# Patient Record
Sex: FEMALE | Race: BLACK OR AFRICAN AMERICAN | NOT HISPANIC OR LATINO | URBAN - METROPOLITAN AREA
[De-identification: names, ages, dates, MRNs, and addresses within clinical notes are randomized per-mention and may not be internally consistent; named-entity substitution may affect disease eponyms.]

---

## 2020-03-21 ENCOUNTER — EMERGENCY (EMERGENCY)
Facility: HOSPITAL | Age: 50
LOS: 0 days | Discharge: HOME | End: 2020-03-21
Attending: EMERGENCY MEDICINE
Payer: COMMERCIAL

## 2020-03-21 VITALS
WEIGHT: 160.06 LBS | OXYGEN SATURATION: 98 % | HEART RATE: 72 BPM | DIASTOLIC BLOOD PRESSURE: 68 MMHG | RESPIRATION RATE: 17 BRPM | TEMPERATURE: 98 F | SYSTOLIC BLOOD PRESSURE: 144 MMHG | HEIGHT: 64 IN

## 2020-03-21 VITALS
OXYGEN SATURATION: 100 % | RESPIRATION RATE: 16 BRPM | HEART RATE: 57 BPM | TEMPERATURE: 98 F | DIASTOLIC BLOOD PRESSURE: 83 MMHG | SYSTOLIC BLOOD PRESSURE: 141 MMHG

## 2020-03-21 DIAGNOSIS — Z96.659 PRESENCE OF UNSPECIFIED ARTIFICIAL KNEE JOINT: ICD-10-CM

## 2020-03-21 DIAGNOSIS — R56.9 UNSPECIFIED CONVULSIONS: ICD-10-CM

## 2020-03-21 DIAGNOSIS — R07.9 CHEST PAIN, UNSPECIFIED: ICD-10-CM

## 2020-03-21 LAB
ALBUMIN SERPL ELPH-MCNC: 3.6 G/DL — SIGNIFICANT CHANGE UP (ref 3.3–5)
ALP SERPL-CCNC: 118 U/L — SIGNIFICANT CHANGE UP (ref 40–120)
ALT FLD-CCNC: 36 U/L — SIGNIFICANT CHANGE UP (ref 12–78)
ANION GAP SERPL CALC-SCNC: 4 MMOL/L — LOW (ref 5–17)
AST SERPL-CCNC: 33 U/L — SIGNIFICANT CHANGE UP (ref 15–37)
BASOPHILS # BLD AUTO: 0.01 K/UL — SIGNIFICANT CHANGE UP (ref 0–0.2)
BASOPHILS NFR BLD AUTO: 0.3 % — SIGNIFICANT CHANGE UP (ref 0–2)
BILIRUB SERPL-MCNC: 0.3 MG/DL — SIGNIFICANT CHANGE UP (ref 0.2–1.2)
BUN SERPL-MCNC: 17 MG/DL — SIGNIFICANT CHANGE UP (ref 7–23)
CALCIUM SERPL-MCNC: 9.2 MG/DL — SIGNIFICANT CHANGE UP (ref 8.5–10.1)
CHLORIDE SERPL-SCNC: 109 MMOL/L — HIGH (ref 96–108)
CK MB CFR SERPL CALC: 1.2 NG/ML — SIGNIFICANT CHANGE UP (ref 0.5–3.6)
CO2 SERPL-SCNC: 28 MMOL/L — SIGNIFICANT CHANGE UP (ref 22–31)
CREAT SERPL-MCNC: 0.79 MG/DL — SIGNIFICANT CHANGE UP (ref 0.5–1.3)
D DIMER BLD IA.RAPID-MCNC: 298 NG/ML DDU — HIGH
EOSINOPHIL # BLD AUTO: 0.05 K/UL — SIGNIFICANT CHANGE UP (ref 0–0.5)
EOSINOPHIL NFR BLD AUTO: 1.3 % — SIGNIFICANT CHANGE UP (ref 0–6)
GLUCOSE SERPL-MCNC: 100 MG/DL — HIGH (ref 70–99)
HCG SERPL-ACNC: 2 MIU/ML — SIGNIFICANT CHANGE UP
HCT VFR BLD CALC: 34.2 % — LOW (ref 34.5–45)
HGB BLD-MCNC: 11.2 G/DL — LOW (ref 11.5–15.5)
IMM GRANULOCYTES NFR BLD AUTO: 0.3 % — SIGNIFICANT CHANGE UP (ref 0–1.5)
LIDOCAIN IGE QN: 173 U/L — SIGNIFICANT CHANGE UP (ref 73–393)
LYMPHOCYTES # BLD AUTO: 1.57 K/UL — SIGNIFICANT CHANGE UP (ref 1–3.3)
LYMPHOCYTES # BLD AUTO: 40.4 % — SIGNIFICANT CHANGE UP (ref 13–44)
MAGNESIUM SERPL-MCNC: 2.1 MG/DL — SIGNIFICANT CHANGE UP (ref 1.6–2.6)
MCHC RBC-ENTMCNC: 31.6 PG — SIGNIFICANT CHANGE UP (ref 27–34)
MCHC RBC-ENTMCNC: 32.7 GM/DL — SIGNIFICANT CHANGE UP (ref 32–36)
MCV RBC AUTO: 96.6 FL — SIGNIFICANT CHANGE UP (ref 80–100)
MONOCYTES # BLD AUTO: 0.34 K/UL — SIGNIFICANT CHANGE UP (ref 0–0.9)
MONOCYTES NFR BLD AUTO: 8.7 % — SIGNIFICANT CHANGE UP (ref 2–14)
NEUTROPHILS # BLD AUTO: 1.91 K/UL — SIGNIFICANT CHANGE UP (ref 1.8–7.4)
NEUTROPHILS NFR BLD AUTO: 49 % — SIGNIFICANT CHANGE UP (ref 43–77)
NRBC # BLD: 0 /100 WBCS — SIGNIFICANT CHANGE UP (ref 0–0)
PLATELET # BLD AUTO: 155 K/UL — SIGNIFICANT CHANGE UP (ref 150–400)
POTASSIUM SERPL-MCNC: 4.5 MMOL/L — SIGNIFICANT CHANGE UP (ref 3.5–5.3)
POTASSIUM SERPL-SCNC: 4.5 MMOL/L — SIGNIFICANT CHANGE UP (ref 3.5–5.3)
PROT SERPL-MCNC: 8 GM/DL — SIGNIFICANT CHANGE UP (ref 6–8.3)
RBC # BLD: 3.54 M/UL — LOW (ref 3.8–5.2)
RBC # FLD: 11.7 % — SIGNIFICANT CHANGE UP (ref 10.3–14.5)
SODIUM SERPL-SCNC: 141 MMOL/L — SIGNIFICANT CHANGE UP (ref 135–145)
TROPONIN I SERPL-MCNC: <.015 NG/ML — SIGNIFICANT CHANGE UP (ref 0.01–0.04)
TROPONIN I SERPL-MCNC: <.015 NG/ML — SIGNIFICANT CHANGE UP (ref 0.01–0.04)
WBC # BLD: 3.89 K/UL — SIGNIFICANT CHANGE UP (ref 3.8–10.5)
WBC # FLD AUTO: 3.89 K/UL — SIGNIFICANT CHANGE UP (ref 3.8–10.5)

## 2020-03-21 PROCEDURE — 93010 ELECTROCARDIOGRAM REPORT: CPT

## 2020-03-21 PROCEDURE — 99285 EMERGENCY DEPT VISIT HI MDM: CPT

## 2020-03-21 PROCEDURE — 71275 CT ANGIOGRAPHY CHEST: CPT | Mod: 26

## 2020-03-21 RX ORDER — ACETAMINOPHEN 500 MG
975 TABLET ORAL ONCE
Refills: 0 | Status: COMPLETED | OUTPATIENT
Start: 2020-03-21 | End: 2020-03-21

## 2020-03-21 RX ADMIN — Medication 975 MILLIGRAM(S): at 15:13

## 2020-03-21 NOTE — ED PROVIDER NOTE - CLINICAL SUMMARY MEDICAL DECISION MAKING FREE TEXT BOX
r/o ACS, r/o PE, r/o pneumothorax r/o ACS, r/o PE, r/o pneumothorax  I read ekg as nsr rate 74, no st elevation or depression, normal qtc, narrow qrs, normal axis, left atrial enlargement. r/o ACS, r/o PE, r/o pneumothorax  I read ekg as nsr rate 74, no st elevation or depression, normal qtc, narrow qrs, normal axis, left atrial enlargement.  trop neg x2. ok for dc home

## 2020-03-21 NOTE — ED PROVIDER NOTE - OBJECTIVE STATEMENT
Pt is a 49 year old female w/PMH of seizures who presents to the ED today for CP. Pt is a 49 year old female w/hx of seizures and knee replacement, who presents to the ED today for right-sided CP x 3 days. CP does not radiate. Denies SOB, headache, fever/chills, N/V/D or cough. Takes medications for knee pain.

## 2020-03-21 NOTE — ED PROVIDER NOTE - MUSCULOSKELETAL, MLM
Spine appears normal, range of motion is not limited, no muscle or joint tenderness, mild reproducible midsternal palpation

## 2020-03-21 NOTE — ED ADULT NURSE NOTE - NSIMPLEMENTINTERV_GEN_ALL_ED
Implemented All Universal Safety Interventions:  [place] to call system. Call bell, personal items and telephone within reach. Instruct patient to call for assistance. Room bathroom lighting operational. Non-slip footwear when patient is off stretcher. Physically safe environment: no spills, clutter or unnecessary equipment. Stretcher in lowest position, wheels locked, appropriate side rails in place.

## 2020-03-21 NOTE — ED PROVIDER NOTE - PATIENT PORTAL LINK FT
You can access the FollowMyHealth Patient Portal offered by [facility] by registering at the following website: [email or web address]. By joining [employer]’s FollowMyHealth portal, you will also be able to view your health information using other applications (apps) compatible with our system.

## 2020-03-21 NOTE — ED ADULT NURSE NOTE - OBJECTIVE STATEMENT
Pt is a 49YOF who is here for pain in her chest, pt states that she has no shortness of breath, no radiation of pain, pt states she has a hx of seizures, pt denies any fever, chills, nausea, vomiting, diarrhea.

## 2020-03-21 NOTE — ED ADULT NURSE NOTE - CHPI ED NUR SYMPTOMS NEG
no nausea/no syncope/no back pain/no chills/no shortness of breath/no vomiting/no fever/no congestion

## 2022-05-27 ENCOUNTER — EMERGENCY (EMERGENCY)
Facility: HOSPITAL | Age: 52
LOS: 1 days | Discharge: HOME | End: 2022-05-27
Admitting: EMERGENCY MEDICINE
Payer: OTHER MISCELLANEOUS

## 2022-05-27 VITALS
RESPIRATION RATE: 18 BRPM | TEMPERATURE: 97 F | HEIGHT: 64 IN | DIASTOLIC BLOOD PRESSURE: 88 MMHG | HEART RATE: 100 BPM | WEIGHT: 164.91 LBS | OXYGEN SATURATION: 100 % | SYSTOLIC BLOOD PRESSURE: 155 MMHG

## 2022-05-27 PROCEDURE — 29125 APPL SHORT ARM SPLINT STATIC: CPT

## 2022-05-27 PROCEDURE — 99053 MED SERV 10PM-8AM 24 HR FAC: CPT

## 2022-05-27 PROCEDURE — 73110 X-RAY EXAM OF WRIST: CPT | Mod: 26,LT

## 2022-05-27 PROCEDURE — 73130 X-RAY EXAM OF HAND: CPT | Mod: 26,LT

## 2022-05-27 PROCEDURE — 99283 EMERGENCY DEPT VISIT LOW MDM: CPT | Mod: 25

## 2022-05-27 NOTE — ED ADULT TRIAGE NOTE - CHIEF COMPLAINT QUOTE
walk in pt with complaints of left hand pain after steel beam fell on it at work. Slight swelling noted at triage.

## 2022-05-28 PROCEDURE — 73130 X-RAY EXAM OF HAND: CPT | Mod: 26,LT

## 2022-05-28 PROCEDURE — 73110 X-RAY EXAM OF WRIST: CPT | Mod: 26,LT

## 2022-05-28 RX ORDER — OXYCODONE AND ACETAMINOPHEN 5; 325 MG/1; MG/1
1 TABLET ORAL ONCE
Refills: 0 | Status: DISCONTINUED | OUTPATIENT
Start: 2022-05-28 | End: 2022-05-28

## 2022-05-28 RX ORDER — MORPHINE SULFATE 50 MG/1
6 CAPSULE, EXTENDED RELEASE ORAL ONCE
Refills: 0 | Status: DISCONTINUED | OUTPATIENT
Start: 2022-05-28 | End: 2022-05-28

## 2022-05-28 RX ORDER — IBUPROFEN 200 MG
600 TABLET ORAL ONCE
Refills: 0 | Status: COMPLETED | OUTPATIENT
Start: 2022-05-28 | End: 2022-05-28

## 2022-05-28 RX ADMIN — Medication 600 MILLIGRAM(S): at 00:30

## 2022-05-28 RX ADMIN — OXYCODONE AND ACETAMINOPHEN 1 TABLET(S): 5; 325 TABLET ORAL at 00:29

## 2022-05-28 NOTE — ED PROVIDER NOTE - CARE PROVIDER_API CALL
Emile Perez (MD)  Plastic Surgery  [address] [place] [address], [address]  [place], [place] [id number]  Phone: [phone]  Fax: [phone]  Follow Up Time: 1-3 Days

## 2022-05-28 NOTE — ED PROVIDER NOTE - PHYSICAL EXAMINATION
Physical Exam    Vital Signs: I have reviewed the initial vital signs.  Constitutional: well-nourished, appears stated age, no acute distress  Cardiovascular: regular rate, regular rhythm, well-perfused extremities, radial pulse +2 and equal b/  Musculoskeletal: +L hand contusion, full ROM in all joints of the hand, no snuff box ttp b/l  Neurologic: extremities’ motor and sensory functions grossly intact

## 2022-05-28 NOTE — ED PROVIDER NOTE - PATIENT PORTAL LINK FT
You can access the FollowMyHealth Patient Portal offered by [facility] by registering at the following website: [email or web address]. By joining [employer] [facility]’s FollowMyHealth portal, you will also be able to view your health information using other applications (apps) compatible with our system.
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
## 2022-05-28 NOTE — ED PROVIDER NOTE - NS ED ROS FT
Review of Systems    Constitutional: (-) fever  Musculoskeletal: (-) neck pain, (-) back pain, (-) joint pain  Integumentary: (-) rash, (-) edema  Neurological: (-) headache, (-) altered mental status  Heme/Lymph: (-) easy bruising (-) easy bleeding

## 2022-05-28 NOTE — ED PROVIDER NOTE - NSFOLLOWUPINSTRUCTIONS_ED_ALL_ED_FT
Hand Contusion  A hand contusion is a deep bruise to the hand. Contusions are the result of a blunt injury to tissues and muscle fibers under the skin. The injury causes bleeding under the skin. The skin overlying the contusion may turn blue, purple, or yellow. Minor injuries will give you a painless contusion, but more severe contusions may stay painful and swollen for a few weeks.    What are the causes?  A contusion is usually caused by a hard hit, trauma, or direct force to your hand, such as having a heavy object fall on your hand.    What are the signs or symptoms?  Symptoms of this condition include:  Swelling of the hand.  Pain and tenderness of the hand.  Discoloration of the hand. The area may have redness and then turn blue, purple, or yellow.  How is this diagnosed?  This condition is diagnosed from a physical exam and your medical history. An X-ray may be needed to see if there are any other injuries, such as broken bones (fractures). Sometimes, a CT scan or MRI may be needed if your health care provider is concerned that you may have torn or injured ligaments.    How is this treated?  An elastic wrap may be recommended to support your hand. In general, the best treatment for a hand contusion is rest, ice, pressure (compression), and elevation of the injured area. This is often called RICE therapy. Over-the-counter medicines may also be recommended for pain control.    Follow these instructions at home:  RICE Therapy     Rest the injured area.  If directed, apply ice to the injured area:  Put ice in a plastic bag.  Place a towel between your skin and the bag.  Leave the ice on for 20 minutes, 2–3 times a day.  If directed, apply light compression to the injured area using an elastic wrap. Make sure the wrap is not too tight. Remove and reapply the wrap as told by your health care provider. If your fingers become numb, cold, or blue, take the wrap off and reapply it more loosely.  Raise (elevate) the injured area above the level of your heart while you are sitting or lying down.  General instructions     Take over-the-counter and prescription medicines only as told by your health care provider.  Protect your hand from getting injured further.  Keep all follow-up visits as told by your health care provider. This is important.  Contact a health care provider if:  Your symptoms do not improve after several days of treatment.  You have increased redness, swelling, or pain in your hand or fingers.  You have difficulty moving the injured area.  Your swelling or pain is not relieved with medicines.  Get help right away if:  You have severe pain.  Your hand or fingers become numb.  Your hand or fingers turn pale, blue, or cold.  You cannot move your hand or wrist.  Your hand is warm to the touch.  This information is not intended to replace advice given to you by your health care provider. Make sure you discuss any questions you have with your health care provider.

## 2022-05-31 DIAGNOSIS — S60.222A CONTUSION OF LEFT HAND, INITIAL ENCOUNTER: ICD-10-CM
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
## 2022-08-04 ENCOUNTER — EMERGENCY (EMERGENCY)
Facility: HOSPITAL | Age: 52
LOS: 0 days | Discharge: HOME | End: 2022-08-04

## 2022-08-04 VITALS
SYSTOLIC BLOOD PRESSURE: 128 MMHG | RESPIRATION RATE: 16 BRPM | HEART RATE: 55 BPM | WEIGHT: 205.03 LBS | HEIGHT: 64 IN | OXYGEN SATURATION: 98 % | DIASTOLIC BLOOD PRESSURE: 79 MMHG | TEMPERATURE: 98 F

## 2022-08-04 DIAGNOSIS — M54.50 LOW BACK PAIN, UNSPECIFIED: ICD-10-CM

## 2022-08-04 DIAGNOSIS — Z86.69 PERSONAL HISTORY OF OTHER DISEASES OF THE NERVOUS SYSTEM AND SENSE ORGANS: ICD-10-CM

## 2022-08-04 PROCEDURE — 99284 EMERGENCY DEPT VISIT MOD MDM: CPT

## 2022-08-04 RX ORDER — METHOCARBAMOL 500 MG/1
1500 TABLET, FILM COATED ORAL ONCE
Refills: 0 | Status: COMPLETED | OUTPATIENT
Start: 2022-08-04 | End: 2022-08-04

## 2022-08-04 RX ORDER — METHOCARBAMOL 500 MG/1
2 TABLET, FILM COATED ORAL
Qty: 30 | Refills: 0
Start: 2022-08-04 | End: 2022-08-08

## 2022-08-04 RX ORDER — KETOROLAC TROMETHAMINE 30 MG/ML
15 SYRINGE (ML) INJECTION ONCE
Refills: 0 | Status: DISCONTINUED | OUTPATIENT
Start: 2022-08-04 | End: 2022-08-04

## 2022-08-04 RX ORDER — IBUPROFEN 200 MG
1 TABLET ORAL
Qty: 20 | Refills: 0
Start: 2022-08-04 | End: 2022-08-08

## 2022-08-04 RX ADMIN — Medication 15 MILLIGRAM(S): at 23:20

## 2022-08-04 RX ADMIN — METHOCARBAMOL 1500 MILLIGRAM(S): 500 TABLET, FILM COATED ORAL at 23:20

## 2022-08-04 NOTE — ED ADULT TRIAGE NOTE - CHIEF COMPLAINT QUOTE
Pt present to ED with c/o right hip pain radiating to knee x 4 days, Pt denies any traum to site. Pt denies any other symptoms. Pt denies PMH

## 2022-08-04 NOTE — ED PROVIDER NOTE - PROVIDER TOKENS
PROVIDER:[TOKEN:[[id number]],FOLLOWUP:[1-3 Days]],FREE:[LAST:[your pmd in 1-3 days],PHONE:[(   )    -],FAX:[(   )    -]]

## 2022-08-04 NOTE — ED PROVIDER NOTE - CLINICAL SUMMARY MEDICAL DECISION MAKING FREE TEXT BOX
+back pain radiating down leg. 2+ pulses.  no red flags.   Reviewed necessity for follow up. Counseled on red flags and to return for them.  Patient appears well on discharge.

## 2022-08-04 NOTE — ED PROVIDER NOTE - CARE PROVIDER_API CALL
Blake Teresa (DO)  Orthopaedic Surgery Surgery  [address], [address] [place] [address]  Phone: [phone]  Fax: [phone]  Follow Up Time: 1-3 Days    your pmd in 1-3 days,   Phone: (   )    -  Fax: (   )    -  Follow Up Time:

## 2022-08-04 NOTE — ED PROVIDER NOTE - OBJECTIVE STATEMENT
51 y/o F with PMH seizures, hx R knee replacement presents with moderate constant throbbing R lower back pain radiating into R anterior leg x 4 days. +much worse with movement, much better with rest.   denies saddle anesthesia, bowel/bladder dysfunction, difficulty ambulating, paraesthesias, direct trauma, hx IVDA, recent injections, weakness, hx back surgeries, unexplained wt loss, fever, cp, sob, n/v/d, rash, other sxs.  Denies hemoptysis, recent surgery/immobilization, hx cancers, hx PE/DVT, hormone use, calf pain or swelling.   no knee trauma/redness/warmth/swelling.

## 2022-08-04 NOTE — ED PROVIDER NOTE - NS ED ROS FT
Review of Systems    Constitutional: (-) fever   Eyes/ENT: (-) vision changes  Cardiovascular: (-) chest pain, (-) syncope (-) palpitations  Respiratory: (-) cough, (-) shortness of breath  Gastrointestinal: (-) vomiting, (-) diarrhea (-) abdominal pain  Genitourinary:  (-) dysuria   Musculoskeletal: (-) neck pain, (+) back pain, (-) leg swelling  Integumentary: (-) rash, (-) edema  Neurological: (-) headache  Hematologic: (-) easy bruising

## 2022-08-04 NOTE — ED PROVIDER NOTE - PHYSICAL EXAMINATION
PHYSICAL EXAM:    GENERAL: Alert, appears stated age, well appearing, non-toxic  SKIN: Warm, pink and dry. MMM.   HEAD: NC, AT  EYE: Normal lids/conjunctiva  ENT: Normal hearing, patent oropharynx   NECK: +supple. No meningismus, or JVD  Pulm: Bilateral BS, normal resp effort, no wheezes, stridor, or retractions  CV: RRR, no M/R/G, 2+and = radial pulses  Abd: soft, non-tender, non-distended, no rebound/guarding. no CVA tenderness.   Mskel: no erythema, cyanosis, edema. no calf tenderness. no spinal TTP. +R sciatic notch TTP. +R paralumbar TTP. no spinal ttp. no knee/hip/femur/calf TTP/swelling/erythema.   Neuro: AAOx3, no sensory/motor deficits, 5/5 strength throughout. normal gait.

## 2022-08-04 NOTE — ED PROVIDER NOTE - PATIENT PORTAL LINK FT
You can access the FollowMyHealth Patient Portal offered by [facility] by registering at the following website: [email or web address]. By joining [employer]’s FollowMyHealth portal, you will also be able to view your health information using other applications (apps) compatible with our system.

## 2022-08-31 ENCOUNTER — EMERGENCY (EMERGENCY)
Facility: HOSPITAL | Age: 52
LOS: 1 days | Discharge: HOME | End: 2022-08-31
Admitting: EMERGENCY MEDICINE

## 2022-08-31 VITALS
TEMPERATURE: 97 F | HEART RATE: 60 BPM | DIASTOLIC BLOOD PRESSURE: 77 MMHG | RESPIRATION RATE: 16 BRPM | SYSTOLIC BLOOD PRESSURE: 146 MMHG | OXYGEN SATURATION: 100 %

## 2022-08-31 VITALS
SYSTOLIC BLOOD PRESSURE: 129 MMHG | RESPIRATION RATE: 16 BRPM | DIASTOLIC BLOOD PRESSURE: 66 MMHG | HEART RATE: 59 BPM | TEMPERATURE: 98 F | OXYGEN SATURATION: 97 %

## 2022-08-31 DIAGNOSIS — Z96.651 PRESENCE OF RIGHT ARTIFICIAL KNEE JOINT: Chronic | ICD-10-CM

## 2022-08-31 PROCEDURE — 73564 X-RAY EXAM KNEE 4 OR MORE: CPT | Mod: 26,50

## 2022-08-31 PROCEDURE — 99284 EMERGENCY DEPT VISIT MOD MDM: CPT

## 2022-08-31 RX ORDER — ACETAMINOPHEN 500 MG
650 TABLET ORAL ONCE
Refills: 0 | Status: COMPLETED | OUTPATIENT
Start: 2022-08-31 | End: 2022-08-31

## 2022-08-31 RX ADMIN — Medication 650 MILLIGRAM(S): at 14:40

## 2022-08-31 NOTE — ED PROVIDER NOTE - PATIENT PORTAL LINK FT
You can access the FollowMyHealth Patient Portal offered by [facility] by registering at the following website: [email or web address]. By joining [employer]’s FollowMyHealth portal, you will also be able to view your health information using other applications (apps) compatible with our system. You can access the FollowMyHealth Patient Portal offered by [facility] by registering at the following website: [email or web address]. By joining [employer]’s FollowMyHealth portal, you will also be able to view your health information using other applications (apps) compatible with our system. You can access the FollowMyHealth Patient Portal offered by [facility] by registering at the following website: [email or web address]. By joining [employer]’s FollowMyHealth portal, you will also be able to view your health information using other applications (apps) compatible with our system.

## 2022-08-31 NOTE — ED PROVIDER NOTE - OBJECTIVE STATEMENT
51 y/o female with no significant PMHx presents to the ER c/o b/l knee R>L pain s/p mechanical trip and fall.  Pt states she was at work and tripped over some wires.  Pt states she fell onto her knee.  Pt is s/p right knee replacement.  Pt denies head trauma, loc, weakness, numbness, tingling.

## 2022-08-31 NOTE — ED PROVIDER NOTE - CLINICAL SUMMARY MEDICAL DECISION MAKING FREE TEXT BOX
53 y/o female with no significant PMHx presents to the ER c/o b/l knee R>L pain s/p mechanical trip and fall.  Pt is well appearing, NAD, will obtain xrays, pain control, follow up Orthopedics. 51 y/o female with no significant PMHx presents to the ER c/o b/l knee R>L pain s/p mechanical trip and fall.  Pt is well appearing, NAD, will obtain xrays, pain control, follow up Orthopedics.

## 2022-08-31 NOTE — ED PROVIDER NOTE - PHYSICAL EXAMINATION
Right knee: NV intact, well healed linear scar, pt able to flex and extend, +TTP at anterior aspect of knee.   Left knee: NV intact, pt able to flex and extend, +TTP at anterior aspect of knee.  Pt ambulatory without assistance.

## 2022-08-31 NOTE — ED PROVIDER NOTE - NSFOLLOWUPINSTRUCTIONS_ED_ALL_ED_FT
Follow up with your Doctor in 1-2 days.  Follow up with Orthopedics in 1-2 days.(see attached list)  Rest.  Ice 3-4 x a day.   Use cane to ambulate.  Return to the ER for any persistent/worsening or new symptoms weakness, numbness or any concerning symptoms.

## 2022-08-31 NOTE — ED PROVIDER NOTE - GASTROINTESTINAL, MLM
ANTICOAGULATION MANAGEMENT     Patient Name:  Amira Arreola  Date:  2020    ASSESSMENT /SUBJECTIVE:    Today's INR result of 2.3 is therapeutic. Goal INR of 2.0-3.0      Warfarin dose taken: Warfarin taken as previously instructed    Diet: No new diet changes affecting INR    Medication changes/ interactions: No new medications/supplements affecting INR    Previous INR: Therapeutic     S/S of bleeding or thromboembolism: No    New injury or illness: No    Upcoming surgery, procedure or cardioversion: No    Additional findings: None      PLAN:    Spoke with Jessica regarding INR result and instructed:     Warfarin Dosing Instructions: Continue your current warfarin dose of 2mg Tues/Sat; 4mg all other days    Instructed patient to follow up no later than: 6 weeks  Orders given to  Homecare nurse/facility to recheck    Education provided: Target INR goal and significance of current INR result      Jessica verbalizes understanding and agrees to warfarin dosing plan.    Instructed to call the Anticoagulation Clinic for any changes, questions or concerns. (#[phone])        OBJECTIVE:  INR   Date Value Ref Range Status   2020 2.3 (A) 0.90 - 1.10 Final             Anticoagulation Summary  As of 2020    INR goal:   2.0-3.0   TTR:   86.0 % (11.5 mo)   INR used for dosin.3 (2020)   Warfarin maintenance plan:   2 mg (4 mg x 0.5) every Tue, Sat; 4 mg (4 mg x 1) all other days   Full warfarin instructions:   2 mg every Tue, Sat; 4 mg all other days   Weekly warfarin total:   24 mg   Plan last modified:   Ines Dorman RN (2019)   Next INR check:   2020   Priority:   Maintenance   Target end date:   Indefinite    Indications    Long term current use of anticoagulant therapy [Z79.01]  Atrial fibrillation (H) [I48.91] (Resolved) [I48.91]             Anticoagulation Episode Summary     INR check location:       Preferred lab:   EXTERNAL LAB    Send INR reminders to:   DANTE SUMNER     Comments:    Rajiv RN [facility] [phone] private pay nursing visits to check INR      Anticoagulation Care Providers     Provider Role Specialty Phone number    Addy Frias MD [facility] Internal Medicine [phone]          Abdomen soft, non-tender, no guarding.

## 2022-08-31 NOTE — ED ADULT NURSE NOTE - OBJECTIVE STATEMENT
received pt in intake room 10A, 52 yr/o female A+OX4, ambulatory at baseline. pt presented to ED c/o B/L knee pain s/p trip and fall, denies hitting her head or LOC. pt denies chest pain and SOB. pt is stable at this time. no defomities noted at joints.

## 2022-08-31 NOTE — ED ADULT NURSE NOTE - NSIMPLEMENTINTERV_GEN_ALL_ED
Implemented All Universal Safety Interventions:  [place] to call system. Call bell, personal items and telephone within reach. Instruct patient to call for assistance. Room bathroom lighting operational. Non-slip footwear when patient is off stretcher. Physically safe environment: no spills, clutter or unnecessary equipment. Stretcher in lowest position, wheels locked, appropriate side rails in place. Implemented All Universal Safety Interventions:  [place] to call system. Call bell, personal items and telephone within reach. Instruct patient to call for assistance. Room bathroom lighting operational. Non-slip footwear when patient is off stretcher. Physically safe environment: no spills, clutter or unnecessary equipment. Stretcher in lowest position, wheels locked, appropriate side rails in place.

## 2022-08-31 NOTE — ED PROVIDER NOTE - NSICDXPASTSURGICALHX_GEN_ALL_CORE_FT
Detail Level: Zone Quality 110: Preventive Care And Screening: Influenza Immunization: Influenza Immunization Administered during Influenza season Quality 226: Preventive Care And Screening: Tobacco Use: Screening And Cessation Intervention: Patient screened for tobacco use and is an ex/non-smoker PAST SURGICAL HISTORY:  S/P total knee replacement, right

## 2022-09-20 NOTE — ED PROVIDER NOTE - NS ED MD DISPO DISCHARGE CCDA
When pt is no longer an acute or imminent risk of harm to self or others, and is able to care for self safely, pt may then be discharged.   CGI less than or equal to 3 When pt is no longer an acute or imminent risk of harm to self or others, and is able to care for self safely, pt may then be discharged.  Patient/Caregiver provided printed discharge information. When pt is no longer an acute or imminent risk of harm to self or others, and is able to care for self safely, pt may then be discharged.   CGI less than or equal to 3

## 2023-01-28 NOTE — ED PROVIDER NOTE - PRO INTERPRETER NEED 2
"[facility]    Medicine Progress Note - Hospitalist Service    Date of Admission:  1/25/2023  Date of Service: 1/28/2023   HD # 3    Assessment & Plan   Summary of Stay: Giselle Chino is a 83 year old female with a history of htn/hlp, COPD not on home oxygen, G1 diastolic dysfunction and mod aortic stenosis by echo 11/2022, large thoracic aortic aneurysm  admitted on 1/25/2023 with CP     She notes chronic and progressive SOB over the past year but states she doesn't usually have chest pain. This morning when she got out of bed she felt \"woozy\" so went to sit in her chair.  She sat for a while and just tried to be calm and felt better.  She then got up and noticed some chest pressure/pain that radiated up her left neck/jaw. She had some nausea but no vomiting. After that she decided to go lay down in her bed and her symptoms subsided. They returned again this afternoon and so she called EMS and was brought in here     She reports feeling better since being here but is not sure if the NTG was what helped her     In the ER she's been quite hypertensive in the 160-200/50-70's.    Troponin is 18 and EKG to my personal read NSR without acute st-t changes  BMP wnl, CBC with anemia at 10.7  COVID/flu/rsv negative     CT aortic survey with very large thoracic aneurysm at 8.3 cm (!) up from 7.9 cm 2019     Chest Pain   Mildly abnormal stress test in the setting of chest discomfort (LAD territory).   Aortic stenosis/ aortic regurgitation  HTN  HLD  Thoracic aortic aneurysm 8.5 cm severe calcification.  Not an ideal surgical candidate.  Improved.  -- S/P stress test. Seen by Cardiology -- recommending medical management as she has a very large ascending aortic aneurysm that increases risk with cath and wires to access the coronary anatomy.  She has improved as far as symptoms given improvement with BP.  Continue work on afterload reduction and diuresis.  -- Continue amlodipine, hydralazine, " metoprolol, torsemide.  Added isosorbide by Cardiology. Continue statin.   -- Follow up with PCP and Cardiology as outpatient.     COPD  Not on home O2.   Duo nebs PRN.     Macular degeneration/ right eye erythema: Asking for eye gtts. Systane and polytrim.  Followed by [facility] -- Dr. Adalberto Holder -- [phone].       Hypothyroidism: Continue replacement.     Failure to thrive: deconditioned.  Discussed with patient and family.  Prefer home care over TCU.  Wants to be independent for as long as possible and then go to hospice.   Not ready for hospice yet.  We discussed goals of care and POLST was completed and executed.  DNR/DNI, comfort based approach, no enteral nutrition.  No IV/IM abx but okay for PO abx.  PT consult placed.  If unable to see will order C RN and PT at discharge.     COVID 19 status: negative  S/p 2 shot moderna series f/b 3 pfizer boosters 10/2022 (biv)          Diet: Combination Diet No Caffeine Diet, Low Saturated Fat Na <2400mg Diet, Low Saturated Fat Diet    DVT Prophylaxis: ASA  Ulrich Catheter: Not present  Lines: None     Cardiac Monitoring: ACTIVE order. Indication: Chest pain/ ACS rule out (24 hours)  Code Status: No CPR- Pre-arrest intubation OK      Disposition Plan  pending clinical course.     Expected Discharge Date: 01/29/2023, 12:00 PM              The patient's care was discussed with the Bedside Nurse, Care Coordinator/, Patient, Patient's Family and Cardiology Team.    IRMA Millan [facility]  Hospitalist Service  [facility]  Securely message with [employer] (more info)  Text page via [facility] Paging/Directory   ______________________________________________________________________    Interval History   Ms. Chino was seen and examined. VSS and improved.  No CP or worsening SOB.  Feels weak but does not want TCU. Wants to go home.  Extensive conversation regarding goals of care.  Family (dtr) updated via phone.     Physical  Exam   Vital Signs: Temp: 98.3  F (36.8  C) Temp src: Oral BP: 132/46 Pulse: 85   Resp: 19 SpO2: 96 % O2 Device: None (Room air)    Weight: 129 lbs 8 oz    GEN:   Alert, oriented x 3, appears comfortable, NAD.  NECK:   Supple ,no mass or thyromegaly   HEENT:  Normocephalic/atraumatic, no scleral icterus, no nasal discharge, mouth moist.  CV:   Regular rate and rhythm, III/VI LIZZY or JVD.  S1 + S2 noted, no S3 or S4.  LUNGS:  Clear upper lobes.  Diminished bases with faint crackles.  ABD:   Active bowel sounds, soft, non-tender/non-distended.  No rebound/guarding/rigidity.  EXT:   No edema.  No cyanosis.  No joint synovitis noted.  SKIN:   Dry to touch, no exanthems noted in the visualized areas.  Neurologic: Grossly intact,non focal.   Neuropsychiatric:  General: normal, calm and normal eye contact  Level of consciousness: alert / normal  Affect: normal  Orientation: oriented to self, place, time and situation     Medical Decision Making       70 MINUTES SPENT BY ME on the date of service doing chart review, history, exam, documentation & further activities per the note.    Including goals of care and ADR planning.      Data    Lab Results   Component Value Date    WBC 8.7 01/26/2023    WBC 7.8 11/11/2019     Lab Results   Component Value Date    RBC 4.06 01/26/2023    RBC 4.42 11/11/2019     Lab Results   Component Value Date    HGB 11.2 01/26/2023    HGB 12.4 11/11/2019     Lab Results   Component Value Date    HCT 36.6 01/26/2023    HCT 39.2 11/11/2019     No components found for: MCT  Lab Results   Component Value Date    MCV 90 01/26/2023    MCV 89 11/11/2019     Lab Results   Component Value Date    MCH 27.6 01/26/2023    MCH 28.1 11/11/2019     Lab Results   Component Value Date    MCHC 30.6 01/26/2023    MCHC 31.6 11/11/2019     Lab Results   Component Value Date    RDW 14.1 01/26/2023    RDW 13.5 11/11/2019     Lab Results   Component Value Date     01/26/2023     11/11/2019        Last  Comprehensive Metabolic Panel:  Sodium   Date Value Ref Range Status   01/28/2023 139 136 - 145 mmol/L Final   03/16/2021 142 133 - 144 mmol/L Final     Potassium   Date Value Ref Range Status   01/28/2023 3.9 3.4 - 5.3 mmol/L Final   06/22/2022 3.8 3.4 - 5.3 mmol/L Final   03/16/2021 4.0 3.4 - 5.3 mmol/L Final     Chloride   Date Value Ref Range Status   01/28/2023 102 98 - 107 mmol/L Final   06/22/2022 107 94 - 109 mmol/L Final   03/16/2021 109 94 - 109 mmol/L Final     Carbon Dioxide   Date Value Ref Range Status   03/16/2021 31 20 - 32 mmol/L Final     Carbon Dioxide (CO2)   Date Value Ref Range Status   01/28/2023 28 22 - 29 mmol/L Final   06/22/2022 28 20 - 32 mmol/L Final     Anion Gap   Date Value Ref Range Status   01/28/2023 9 7 - 15 mmol/L Final   06/22/2022 4 3 - 14 mmol/L Final   03/16/2021 2 (L) 3 - 14 mmol/L Final     Glucose   Date Value Ref Range Status   01/28/2023 98 70 - 99 mg/dL Final   06/22/2022 95 70 - 99 mg/dL Final   03/16/2021 98 70 - 99 mg/dL Final     Comment:     Fasting specimen     Urea Nitrogen   Date Value Ref Range Status   01/28/2023 15.1 8.0 - 23.0 mg/dL Final   06/22/2022 13 7 - 30 mg/dL Final   03/16/2021 23 7 - 30 mg/dL Final     Creatinine   Date Value Ref Range Status   01/28/2023 0.65 0.51 - 0.95 mg/dL Final   03/16/2021 0.84 0.52 - 1.04 mg/dL Final     GFR Estimate   Date Value Ref Range Status   01/28/2023 86 >60 mL/min/1.73m2 Final     Comment:     eGFR calculated using 2021 CKD-EPI equation.   03/16/2021 64 >60 mL/min/[1.73_m2] Final     Comment:     Non  GFR Calc  Starting 12/18/2018, serum creatinine based estimated GFR (eGFR) will be   calculated using the Chronic Kidney Disease Epidemiology Collaboration   (CKD-EPI) equation.       Calcium   Date Value Ref Range Status   01/28/2023 9.7 8.8 - 10.2 mg/dL Final   03/16/2021 9.9 8.5 - 10.1 mg/dL Final      Recent Results (from the past 4320 hour(s))   Echocardiogram Complete   Result Value    LVEF   60-65%    [facility]    [id number]  [id number]  [id number]  [id number]^STEVE^STEVE^JESSICA     [facility]  Echocardiography Laboratory  [address] [place] [address]     Name: INDIANA NYE  MRN: [id number]  : 1939  Study Date: 2023 11:42 AM  Age: 83 yrs  Gender: Female  Patient Location: [facility]  Reason For Study: SOB  Ordering Physician: STEVE WILSON  Performed By: Lionel Madison RDCS     BSA: 1.7 m2  Height: 64 in  Weight: 139 lb  HR: 85  BP: 185/72 mmHg  ______________________________________________________________________________  Procedure  Complete Portable Echo Adult.  ______________________________________________________________________________  Interpretation Summary     The ascending aorta is Severely dilated. (8.4 cm)  Moderate aortic root dilatation.  The visual ejection fraction is 60-65%.  Left ventricular systolic function is normal.  There is mod-severe to severe (3-4+) aortic regurgitation.  Mild valvular aortic stenosis.  Pressure half time 259 ms.  AI ERO is 0.55 cm2  ______________________________________________________________________________  Left Ventricle  The left ventricle is normal in size. A sigmoid septum is present. There is  mild concentric left ventricular hypertrophy. The visual ejection fraction is  60-65%. Left ventricular systolic function is normal. Grade I or early  diastolic dysfunction.     Right Ventricle  The right ventricle is normal in size and function.     Atria  The left atrium is moderately dilated. Right atrial size is normal. There is  no color Doppler evidence of an atrial shunt.     Mitral Valve  There is mild to moderate mitral annular calcification. There is trace mitral  regurgitation.     Tricuspid Valve  There is trace tricuspid regurgitation. Right ventricular systolic pressure  could not be approximated due to inadequate tricuspid regurgitation.     Aortic Valve  The aortic valve is trileaflet. There is mod-severe to severe  (3-4+) aortic  regurgitation. Pressure half time 259 ms.  AI ERO is 0.55 cm2. There is an eccentric jet of aortic insufficiency directed  against the anterior mitral leaflet. The calculated aortic valve are is 1.7  cm^2. The peak AoV pressure gradient is 47.0 mmHg. The mean AoV pressure  gradient is 23.0 mmHg. Mild valvular aortic stenosis.     Pulmonic Valve  There is trace pulmonic valvular regurgitation.     Vessels  Moderate aortic root dilatation. Effacement of the sinotubular junction noted.  The ascending aorta is Severely dilated. (8.4 cm). IVC diameter and  respiratory changes fall into an intermediate range suggesting an RA pressure  of 8 mmHg.     Pericardium  There is no pericardial effusion.     Rhythm  Sinus rhythm was noted.  ______________________________________________________________________________  MMode/2D Measurements & Calculations     IVSd: 1.2 cm  LVIDd: 5.3 cm  LVIDs: 3.5 cm  LVPWd: 1.7 cm  FS: 34.2 %  LV mass(C)d: 332.4 grams  LV mass(C)dI: 198.3 grams/m2  Ao root diam: 4.5 cm  LVOT diam: 2.0 cm  LVOT area: 3.1 cm2  RWT: 0.62     Doppler Measurements & Calculations  MV E max martell: 90.5 cm/sec  MV A max martell: 144.0 cm/sec  MV E/A: 0.63  MV dec slope: 446.0 cm/sec2  MV dec time: 0.16 sec  Ao V2 max: 344.0 cm/sec  Ao max P.0 mmHg  Ao V2 mean: 221.0 cm/sec  Ao mean P.0 mmHg  Ao V2 VTI: 68.0 cm  GIANNA(I,D): 1.7 cm2  GIANNA(V,D): 1.7 cm2  AI P1/2t: 259.3 msec  LV V1 max P.4 mmHg  LV V1 max: 183.0 cm/sec  LV V1 VTI: 37.2 cm  SV(LVOT): 116.9 ml  SI(LVOT): 69.7 ml/m2  PA acc time: 0.10 sec     AV Martell Ratio (DI): 0.53  GIANNA Index (cm2/m2): 1.0  E/E' av.4  Lateral E/e': 13.6  Medial E/e': 9.2     ______________________________________________________________________________  Report approved by: Brayan Mckeon 2023 01:01 PM         Echocardiogram Complete   Result Value    LVEF  55-60%    Narrative    [id number]  [id number]  [id number]  [id number]^CHRISTOPHER^TY^MILDRED     [facility]  [facility]  Echocardiography Laboratory  [address] [place] [address]     Name: INDIANA NYE  MRN: [id number]  : 1939  Study Date: 2022 08:37 AM  Age: 83 yrs  Gender: Female  Patient Location: [facility]  Reason For Study: Nonrheumatic aortic valve insufficiency, Thoracic aortic  aneurys  Ordering Physician: TY WHITE  Referring Physician: TY WHITE  Performed By: Isabel Anna     BSA: 1.7 m2  Height: 64 in  Weight: 139 lb  HR: 76  BP: 130/67 mmHg  ______________________________________________________________________________  Procedure  Complete Echo Adult.  ______________________________________________________________________________  Interpretation Summary     1. The left ventricle is normal in size. Left ventricular systolic function is  normal. The visual ejection fraction is 55-60%. Grade I or early diastolic  dysfunction. Diastolic Doppler findings (E/E' ratio and/or other parameters)  suggest left ventricular filling pressures are indeterminate. No regional wall  motion abnormalities noted.  2. The right ventricle is normal size. The right ventricular systolic function  is normal.  3. The aortic valve is not well visualized. There is moderate (2+) aortic  regurgitation. Moderate valvular aortic stenosis. The peak AoV pressure  gradient is 48.0 mmHg. The mean AoV pressure gradient is 25.1 mmHg.  4. The ascending aorta is Severely dilated.  5. No pericardial effusion.  6. In direct comparison to the previous study dated 2022, the findings  are similar. The discrepancy in aortic valve area is related to the elevated  LVOT VTI on the current study.  ______________________________________________________________________________  Left Ventricle  The left ventricle is normal in size. Left ventricular systolic function is  normal. The visual ejection fraction is 55-60%. Grade I or early diastolic  dysfunction. Diastolic Doppler findings (E/E' ratio  and/or other parameters)  suggest left ventricular filling pressures are indeterminate. No regional wall  motion abnormalities noted.     Right Ventricle  The right ventricle is normal size. The right ventricular systolic function is  normal.     Atria  The left atrium is severely dilated.     Mitral Valve  There is trace mitral regurgitation.     Tricuspid Valve  There is trace tricuspid regurgitation. Right ventricular systolic pressure  could not be approximated due to inadequate tricuspid regurgitation.     Aortic Valve  The aortic valve is not well visualized. There is moderate (2+) aortic  regurgitation. Moderate valvular aortic stenosis. The peak AoV pressure  gradient is 48.0 mmHg. The mean AoV pressure gradient is 25.1 mmHg.     Pulmonic Valve  There is trace to mild pulmonic valvular regurgitation. There is no pulmonic  valvular stenosis.     Vessels  The ascending aorta is Severely dilated. The inferior vena cava is normal.     Pericardium  There is no pericardial effusion.     Rhythm  Sinus rhythm was noted.  ______________________________________________________________________________  MMode/2D Measurements & Calculations  asc Aorta Diam: 8.5 cm     LVOT diam: 2.1 cm  LVOT area: 3.5 cm2     Doppler Measurements & Calculations  MV E max martell: 61.2 cm/sec  MV A max martell: 124.8 cm/sec  MV E/A: 0.49  MV dec slope: 164.1 cm/sec2  MV dec time: 0.37 sec  Ao V2 max: 348.7 cm/sec  Ao max P.0 mmHg  Ao V2 mean: 233.3 cm/sec  Ao mean P.1 mmHg  Ao V2 VTI: 71.5 cm  GIANNA(I,D): 2.1 cm2  GIANNA(V,D): 2.0 cm2  AI P1/2t: 376.3 msec  LV V1 max P.2 mmHg  LV V1 max: 201.1 cm/sec  LV V1 VTI: 43.2 cm  SV(LVOT): 153.2 ml  SI(LVOT): 91.4 ml/m2  PA acc time: 0.08 sec  AV Martell Ratio (DI): 0.58  GIANNA Index (cm2/m2): 1.3  E/E' av.8  Lateral E/e': 12.2  Medial E/e': 13.4     ______________________________________________________________________________  Report approved by: Brayan Salazar 2022 10:11 AM             English

## 2023-01-31 NOTE — ED ADULT TRIAGE NOTE - LOCATION:
2023    To Aguilar Bautista  : 1993      Esta carta es para informarle que leoncio CULTURAS recientes para la gonorrea y la clamidia fueron revisadas por mí y son Yisel Riedel    Comuníquese con la oficina para radha ion si tiene alguna inquietud [address] [place] [address] Tristan [facility] Left arm;

## 2023-04-04 ENCOUNTER — EMERGENCY (EMERGENCY)
Facility: HOSPITAL | Age: 53
LOS: 0 days | Discharge: HOME | End: 2023-04-05
Attending: STUDENT IN AN ORGANIZED HEALTH CARE EDUCATION/TRAINING PROGRAM
Payer: COMMERCIAL

## 2023-04-04 VITALS
RESPIRATION RATE: 18 BRPM | HEART RATE: 72 BPM | OXYGEN SATURATION: 100 % | DIASTOLIC BLOOD PRESSURE: 86 MMHG | SYSTOLIC BLOOD PRESSURE: 142 MMHG | WEIGHT: 195.11 LBS | TEMPERATURE: 98 F | HEIGHT: 64 IN

## 2023-04-04 DIAGNOSIS — Z96.651 PRESENCE OF RIGHT ARTIFICIAL KNEE JOINT: Chronic | ICD-10-CM

## 2023-04-04 PROCEDURE — 93010 ELECTROCARDIOGRAM REPORT: CPT

## 2023-04-04 PROCEDURE — 99284 EMERGENCY DEPT VISIT MOD MDM: CPT

## 2023-04-04 NOTE — ED ADULT TRIAGE NOTE - CHIEF COMPLAINT QUOTE
pt here for dizziness and lightheadedness x2 weeks a/w blurry vision.  Pt saw PCP in March where they told her her blood sugar was elevated and started her on metformin 50mg.   denies chest pain, sob, fever, n/v/d, abd pain.  no pmhx, nkda

## 2023-04-05 VITALS
RESPIRATION RATE: 16 BRPM | OXYGEN SATURATION: 98 % | SYSTOLIC BLOOD PRESSURE: 137 MMHG | DIASTOLIC BLOOD PRESSURE: 75 MMHG | HEART RATE: 68 BPM

## 2023-04-05 DIAGNOSIS — Z20.822 CONTACT WITH AND (SUSPECTED) EXPOSURE TO COVID-19: ICD-10-CM

## 2023-04-05 DIAGNOSIS — R42 DIZZINESS AND GIDDINESS: ICD-10-CM

## 2023-04-05 DIAGNOSIS — R53.83 OTHER FATIGUE: ICD-10-CM

## 2023-04-05 LAB
ALBUMIN SERPL ELPH-MCNC: 3.5 G/DL — SIGNIFICANT CHANGE UP (ref 3.3–5)
ALP SERPL-CCNC: 98 U/L — SIGNIFICANT CHANGE UP (ref 40–120)
ALT FLD-CCNC: 37 U/L — SIGNIFICANT CHANGE UP (ref 12–78)
ANION GAP SERPL CALC-SCNC: 4 MMOL/L — LOW (ref 5–17)
APPEARANCE UR: ABNORMAL
APTT BLD: 32.8 SEC — SIGNIFICANT CHANGE UP (ref 27.5–35.5)
AST SERPL-CCNC: 27 U/L — SIGNIFICANT CHANGE UP (ref 15–37)
BACTERIA # UR AUTO: ABNORMAL
BASOPHILS # BLD AUTO: 0.02 K/UL — SIGNIFICANT CHANGE UP (ref 0–0.2)
BASOPHILS NFR BLD AUTO: 0.5 % — SIGNIFICANT CHANGE UP (ref 0–2)
BILIRUB SERPL-MCNC: 0.4 MG/DL — SIGNIFICANT CHANGE UP (ref 0.2–1.2)
BILIRUB UR-MCNC: NEGATIVE — SIGNIFICANT CHANGE UP
BUN SERPL-MCNC: 19 MG/DL — SIGNIFICANT CHANGE UP (ref 7–23)
CALCIUM SERPL-MCNC: 8.7 MG/DL — SIGNIFICANT CHANGE UP (ref 8.5–10.1)
CHLORIDE SERPL-SCNC: 106 MMOL/L — SIGNIFICANT CHANGE UP (ref 96–108)
CO2 SERPL-SCNC: 26 MMOL/L — SIGNIFICANT CHANGE UP (ref 22–31)
COLOR SPEC: YELLOW — SIGNIFICANT CHANGE UP
CREAT SERPL-MCNC: 0.89 MG/DL — SIGNIFICANT CHANGE UP (ref 0.5–1.3)
DIFF PNL FLD: ABNORMAL
EGFR: 77 ML/MIN/1.73M2 — SIGNIFICANT CHANGE UP
EOSINOPHIL # BLD AUTO: 0.05 K/UL — SIGNIFICANT CHANGE UP (ref 0–0.5)
EOSINOPHIL NFR BLD AUTO: 1.2 % — SIGNIFICANT CHANGE UP (ref 0–6)
EPI CELLS # UR: ABNORMAL
FLUAV AG NPH QL: SIGNIFICANT CHANGE UP
FLUBV AG NPH QL: SIGNIFICANT CHANGE UP
GLUCOSE SERPL-MCNC: 141 MG/DL — HIGH (ref 70–99)
GLUCOSE UR QL: NEGATIVE MG/DL — SIGNIFICANT CHANGE UP
HCG SERPL-ACNC: 2 MIU/ML — SIGNIFICANT CHANGE UP
HCT VFR BLD CALC: 31.5 % — LOW (ref 34.5–45)
HGB BLD-MCNC: 10.4 G/DL — LOW (ref 11.5–15.5)
IMM GRANULOCYTES NFR BLD AUTO: 0.2 % — SIGNIFICANT CHANGE UP (ref 0–0.9)
INR BLD: 0.97 RATIO — SIGNIFICANT CHANGE UP (ref 0.88–1.16)
KETONES UR-MCNC: NEGATIVE — SIGNIFICANT CHANGE UP
LEUKOCYTE ESTERASE UR-ACNC: ABNORMAL
LYMPHOCYTES # BLD AUTO: 2.03 K/UL — SIGNIFICANT CHANGE UP (ref 1–3.3)
LYMPHOCYTES # BLD AUTO: 49.8 % — HIGH (ref 13–44)
MCHC RBC-ENTMCNC: 31.7 PG — SIGNIFICANT CHANGE UP (ref 27–34)
MCHC RBC-ENTMCNC: 33 G/DL — SIGNIFICANT CHANGE UP (ref 32–36)
MCV RBC AUTO: 96 FL — SIGNIFICANT CHANGE UP (ref 80–100)
MONOCYTES # BLD AUTO: 0.39 K/UL — SIGNIFICANT CHANGE UP (ref 0–0.9)
MONOCYTES NFR BLD AUTO: 9.6 % — SIGNIFICANT CHANGE UP (ref 2–14)
NEUTROPHILS # BLD AUTO: 1.58 K/UL — LOW (ref 1.8–7.4)
NEUTROPHILS NFR BLD AUTO: 38.7 % — LOW (ref 43–77)
NITRITE UR-MCNC: NEGATIVE — SIGNIFICANT CHANGE UP
NRBC # BLD: 0 /100 WBCS — SIGNIFICANT CHANGE UP (ref 0–0)
PH UR: 6 — SIGNIFICANT CHANGE UP (ref 5–8)
PLATELET # BLD AUTO: 216 K/UL — SIGNIFICANT CHANGE UP (ref 150–400)
POTASSIUM SERPL-MCNC: 3.9 MMOL/L — SIGNIFICANT CHANGE UP (ref 3.5–5.3)
POTASSIUM SERPL-SCNC: 3.9 MMOL/L — SIGNIFICANT CHANGE UP (ref 3.5–5.3)
PROT SERPL-MCNC: 7.4 GM/DL — SIGNIFICANT CHANGE UP (ref 6–8.3)
PROT UR-MCNC: 30 MG/DL
PROTHROM AB SERPL-ACNC: 11.5 SEC — SIGNIFICANT CHANGE UP (ref 10.5–13.4)
RBC # BLD: 3.28 M/UL — LOW (ref 3.8–5.2)
RBC # FLD: 11.9 % — SIGNIFICANT CHANGE UP (ref 10.3–14.5)
RBC CASTS # UR COMP ASSIST: NEGATIVE /HPF — SIGNIFICANT CHANGE UP (ref 0–4)
SARS-COV-2 RNA SPEC QL NAA+PROBE: SIGNIFICANT CHANGE UP
SODIUM SERPL-SCNC: 136 MMOL/L — SIGNIFICANT CHANGE UP (ref 135–145)
SP GR SPEC: 1.02 — SIGNIFICANT CHANGE UP (ref 1.01–1.02)
TROPONIN I, HIGH SENSITIVITY RESULT: 4.9 NG/L — SIGNIFICANT CHANGE UP
UROBILINOGEN FLD QL: NEGATIVE MG/DL — SIGNIFICANT CHANGE UP
WBC # BLD: 4.08 K/UL — SIGNIFICANT CHANGE UP (ref 3.8–10.5)
WBC # FLD AUTO: 4.08 K/UL — SIGNIFICANT CHANGE UP (ref 3.8–10.5)
WBC UR QL: SIGNIFICANT CHANGE UP

## 2023-04-05 PROCEDURE — 70450 CT HEAD/BRAIN W/O DYE: CPT | Mod: 26,MA

## 2023-04-05 RX ORDER — SODIUM CHLORIDE 9 MG/ML
1000 INJECTION INTRAMUSCULAR; INTRAVENOUS; SUBCUTANEOUS ONCE
Refills: 0 | Status: COMPLETED | OUTPATIENT
Start: 2023-04-05 | End: 2023-04-05

## 2023-04-05 RX ADMIN — SODIUM CHLORIDE 1000 MILLILITER(S): 9 INJECTION INTRAMUSCULAR; INTRAVENOUS; SUBCUTANEOUS at 02:54

## 2023-04-05 NOTE — ED PROVIDER NOTE - PATIENT PORTAL LINK FT
You can access the FollowMyHealth Patient Portal offered by [facility] by registering at the following website: [email or web address]. By joining [employer]’s FollowMyHealth portal, you will also be able to view your health information using other applications (apps) compatible with our system.

## 2023-04-05 NOTE — ED PROVIDER NOTE - OBJECTIVE STATEMENT
52F no pmhx presenting with dizziness and fatigue x 1 day. Describes mild dizziness as lightheadedness, a/w fatigue. Denies any headaches. Denies any chest pain, abdominal pain, shortness of breath, nausea/vomiting,  fevers, chills, diarrhea ,constipation, weakness, syncope, hematuria, dysuria, urinary symptoms, subjective neurological deficits, trauma, falls, coughs, sick contacts, travels.

## 2023-04-05 NOTE — ED ADULT NURSE NOTE - OBJECTIVE STATEMENT
52 year old female came in here for dizziness and lightheadedness x2 weeks a/w blurry vision.  Pt saw PCP in March where they told her her blood sugar was elevated and started her on metformin 50mg.   denies chest pain, sob, fever, n/v/d, abd pain. pt placed on the monitors upon arrival, vitals WNL, in no sign of dsitress, requested covid swab to be done, A&Ox4  no pmhx, nkda

## 2023-04-05 NOTE — ED PROVIDER NOTE - CLINICAL SUMMARY MEDICAL DECISION MAKING FREE TEXT BOX
Patient presenting with dizziness. Likely pre-syncope or lightheadedness secondary to dehydration? stress? doubt BPH Will obtain labs to rule out etiology of pre-syncope such as poor oral intake, GI fluid losses (diarrhea/vomiting), hemorrhage/anemia, vasovagal, metabolic disturbance, infection or sepsis, or medication induced.     DDX: Doubt SAH/ICH, hemorrhagic stroke, vertebral artery dissection as patient without neck pain or severe headache; ACS/NSTEMI or PE as patient is without chest pain or shortness of breath, malignant arrhythmia; Multiple sclerosis, CVA, [place]-Hunt syndrome as patient is without neurological deficits; vestibular neuronitis or vertigo as patient without room-spinning or nystagmus or cerebellar deficits on exam; Ruptured AAA as patient is without pulsatile abdominal mass, abdominal pain or hypotension; ectopic pregnancy.   PLAN:   - CBC, CMP, EKG normal  - IVF for dehydration  - Reglan for nausea  - Re-evaluation and disposition accordingly.

## 2023-04-05 NOTE — ED PROVIDER NOTE - NSFOLLOWUPINSTRUCTIONS_ED_ALL_ED_FT
Dizziness    Dizziness can manifest as a feeling of unsteadiness or light-headedness. You may feel like you are about to faint. This condition can be caused by a number of things, including medicines, dehydration, or illness. Drink enough fluid to keep your urine clear or pale yellow. Do not drink alcohol and limit your caffeine intake. Avoid quick or sudden movements.  Rise slowly from chairs and steady yourself until you feel okay. In the morning, first sit up on the side of the bed.    SEEK IMMEDIATE MEDICAL CARE IF YOU HAVE ANY OF THE FOLLOWING SYMPTOMS: vomiting, changes in your vision or speech, weakness in your arms or legs, trouble speaking or swallowing, chest pain, abdominal pain, shortness of breath, sweating, bleeding, headache, neck pain, or fever.     Rest, drink plenty of fluids.  Advance activity as tolerated.  Continue all previously prescribed medications as directed.  Follow up with your PMD 2-3 days and bring copies of your results.  Return to the ER for worsening symptoms,

## 2023-04-05 NOTE — ED PROVIDER NOTE - CONTACT TIME
HPI:    Patient ID: Prem Medrano is a 38year old female. Labs due-ordered,f/u mammogram and us due in November.       Immunization History  Administered            Date(s) Administered    FLUZONE 3 Yrs+ Quad Prsv Free 0.5 ml (67786) tightness and shortness of breath. Cardiovascular: Negative. Negative for chest pain. Gastrointestinal: Negative. Endocrine: Negative. Genitourinary: Negative. Musculoskeletal: Positive for arthralgias. Skin: Negative.     Allergic/Immunolo Normal rate, regular rhythm, normal heart sounds and intact distal pulses. No murmur heard. Pulmonary/Chest: Effort normal and breath sounds normal. She has no wheezes. She has no rales. She exhibits no tenderness. Abdominal: Soft.  Bowel sounds are no moderate mitral regurgitation with ejection fraction about 45%. Patient is seen by Dr. Chrissie Soni on a regular basis. Follow-up echocardiogram by April 20 20 has been ordered. She has been asymptomatic. No chest pain, palpitations, shortness of breath.   She TABLETS BY MOUTH EVERY EVENING AS DIRECTED BY COUMADIN CLINIC       Imaging & Referrals:  FLULAVAL INFLUENZA VACCINE QUAD PRESERVATIVE FREE 0.5 ML  JEANMARIE BABAK 2D+3D DIAGNOSTIC JEANMARIE LEFT (CPT=77065/93653)  US BREAST LEFT COMPLETE (QRU=[id number])  CARD ECHO 2D DOPP 05-Apr-2023 05:29

## 2023-04-06 LAB
CULTURE RESULTS: SIGNIFICANT CHANGE UP
SPECIMEN SOURCE: SIGNIFICANT CHANGE UP

## 2023-08-05 ENCOUNTER — EMERGENCY (EMERGENCY)
Facility: HOSPITAL | Age: 53
LOS: 0 days | Discharge: HOME | End: 2023-08-06
Attending: STUDENT IN AN ORGANIZED HEALTH CARE EDUCATION/TRAINING PROGRAM
Payer: COMMERCIAL

## 2023-08-05 VITALS
HEIGHT: 65 IN | OXYGEN SATURATION: 98 % | TEMPERATURE: 98 F | DIASTOLIC BLOOD PRESSURE: 75 MMHG | SYSTOLIC BLOOD PRESSURE: 116 MMHG | WEIGHT: 205.03 LBS | HEART RATE: 80 BPM | RESPIRATION RATE: 14 BRPM

## 2023-08-05 DIAGNOSIS — L03.111 CELLULITIS OF RIGHT AXILLA: ICD-10-CM

## 2023-08-05 DIAGNOSIS — Z96.651 PRESENCE OF RIGHT ARTIFICIAL KNEE JOINT: Chronic | ICD-10-CM

## 2023-08-05 DIAGNOSIS — R22.31 LOCALIZED SWELLING, MASS AND LUMP, RIGHT UPPER LIMB: ICD-10-CM

## 2023-08-05 PROCEDURE — 99283 EMERGENCY DEPT VISIT LOW MDM: CPT

## 2023-08-06 VITALS
SYSTOLIC BLOOD PRESSURE: 128 MMHG | HEART RATE: 60 BPM | OXYGEN SATURATION: 98 % | TEMPERATURE: 98 F | DIASTOLIC BLOOD PRESSURE: 81 MMHG | RESPIRATION RATE: 16 BRPM

## 2023-08-06 RX ADMIN — Medication 1 TABLET(S): at 03:55

## 2023-08-06 NOTE — ED ADULT NURSE NOTE - OBJECTIVE STATEMENT
Pt is a 53y F AOX4 with no sig pmh. Pt reports 2 axillary  abscess on the right arm and 1 axillary  abscess on the left arm that started 2 days ago. Upon inspection the lumps are tender to touch. Pt denies n/v/d, fever, chills, or discharge from the abscess.

## 2023-08-06 NOTE — ED PROVIDER NOTE - PATIENT PORTAL LINK FT
You can access the FollowMyHealth Patient Portal offered by [facility] by registering at the following website: [email or web address]. By joining [employer]’s FollowMyHealth portal, you will also be able to view your health information using other applications (apps) compatible with our system.

## 2023-08-06 NOTE — ED PROVIDER NOTE - CLINICAL SUMMARY MEDICAL DECISION MAKING FREE TEXT BOX
two separate 0.5 cm soft, not fluctuant lesions over the R axilla, one is mildly erythematous. no clear collection for I&D. will give abx and discharge two separate 0.5 cm soft, not fluctuant lesions/nodules over the R axilla, one is mildly erythematous (lower/inferior located one). no clear collection for I&D on bedside sono and lesions are  small.  will give abx for erythematous lesion for possible cellulitis  and discharge. return precautions discussed w/ daughter and patient at bedside. patient declined medication for pain control. requested one dose of abx here in ER prior to discharge.

## 2023-08-06 NOTE — ED PROVIDER NOTE - PHYSICAL EXAMINATION
General: Well appearing female in no acute distress  HEENT: Normocephalic, atraumatic. Moist mucous membranes. Oropharynx clear. No lymphadenopathy.  Eyes: No scleral icterus. EOMI. JACE.  Neck:. Soft and supple. Full ROM without pain. No midline tenderness  Cardiac: Regular rate and regular rhythm. No murmurs, rubs, gallops. Peripheral pulses 2+ and symmetric. No LE edema.  Resp: Lungs CTAB. Speaking in full sentences. No wheezes, rales or rhonchi.  Abd: Soft, non-tender, non-distended. No guarding or rebound. No scars, masses, or lesions.  Back: Spine midline and non-tender. No CVA tenderness.    Skin: +two separate 0.5 cm, soft and non-fluctuant nodules in R axillary region, lower/inferior one is mildly erythematous and tender to touch  Neuro: AO x 3. Moves all extremities symmetrically. Motor strength and sensation grossly intact.

## 2023-08-06 NOTE — ED PROVIDER NOTE - NSFOLLOWUPINSTRUCTIONS_ED_ALL_ED_FT
take antibiotics twice per day for next 5 days.     Cellulitis    Cellulitis is a skin infection caused by bacteria. This condition occurs most often in the arms and lower legs but can occur anywhere over the body. Symptoms include redness, swelling, warm skin, tenderness, and chills/fever. If you were prescribed an antibiotic medicine, take it as told by your health care provider. Do not stop taking the antibiotic even if you start to feel better.    SEEK IMMEDIATE MEDICAL CARE IF YOU HAVE ANY OF THE FOLLOWING SYMPTOMS: worsening fever, red streaks coming from affected area, vomiting or diarrhea, or dizziness/lightheadedness.

## 2023-08-06 NOTE — ED ADULT NURSE NOTE - ASSOCIATED SYMPTOMS
2 axillary  abscess on the right arm and 1 axillary  abscess on the left arm that started 2 days ago.

## 2023-08-06 NOTE — ED PROVIDER NOTE - OBJECTIVE STATEMENT
52 y/o F presents w/ lumps over the R axillary region for past 2 days. patient states she has two separate lumps there, one is more painful than the other. denies prior hx of abscess or similar skin findings In the past. denies shaving in the region or any obvious trauma. denies fever/chills. denies ilicit drug use.

## 2023-08-06 NOTE — ED ADULT NURSE REASSESSMENT NOTE - NS ED NURSE REASSESS COMMENT FT1
Pt AOx4 and resting comfortably in bed. Pt reports no acute distress at this time. Pt accepts the d/c from the MD.

## 2023-08-24 NOTE — ED PROVIDER NOTE - CHIEF COMPLAINT
"Attempted to call number on file to discuss groin pain and just get a dial tone. Spoke to "significant other" on file requesting for an additional phone number. I was given ([phone]) which also just gave me a dial tone. Will attempt to reach out again later today. Unable to leave voicemail.     Elaine Elmore PA-C  Interventional Radiology  [phone]    "
The patient is a 51y Female complaining of hand pain

## 2023-09-22 NOTE — ED ADULT NURSE NOTE - CAS TRG GEN SKIN COLOR
Tigist Jones is a 60 year old old female who presents today for a blood pressure check.    Vitals: 126/70 P 77    Complaints/symptoms: Head pressure, Lf arm tinggling  Comments/patient questions:  Dr. Stephen came in the room and spoke to pt and she was sent to .     Exercise No  Diet Yes  Smoking No  Refill No  Pharmacy preference(s) on file:      [id number] - [place], [place] - [address] [facility] AT [address]  [address] [address]  Phone: [phone] Fax: [phone]    [facility] [address]  [facility] [address]  Phone: [phone] Fax: [phone]        Current Medications include:  Current Outpatient Medications   Medication Sig Dispense Refill   • HYDROcodone-acetaminophen (NORCO) 5-325 MG per tablet Take 1 tablet by mouth every 6 hours as needed for Pain. 20 tablet 0   • docusate sodium (DULCOLAX) 100 MG capsule Take 1 capsule by mouth in the morning and 1 capsule in the evening. 20 capsule 0   • hydroCHLOROthiazide (HYDRODIURIL) 12.5 MG tablet Take 1 tablet by mouth daily.     • traZODone (DESYREL) 50 MG tablet Take 50 mg by mouth nightly.     • B-D LUER-ADILENE SYRINGE 25G X 1\" 3 ML Misc USE WITH BEFORE-12 INJECT WEEKLY     • Vortioxetine HBr (Trintellix) 20 MG Tab Take 20 mg by mouth.     • Semaglutide, 1 MG/DOSE, 4 MG/3ML Solution Pen-injector Inject 1 mg into the skin every 7 days. 3 mL 3   • albuterol 108 (90 Base) MCG/ACT inhaler Inhale 2 puffs into the lungs every 4 hours as needed for Shortness of Breath or Wheezing. 1 each 12   • meloxicam (MOBIC) 15 MG tablet Take 1 tablet by mouth daily (with breakfast) for 10 days. 10 tablet 0   • sucralfate (CARAFATE) 1 g tablet TAKE 1 TABLET BY MOUTH TWICE DAILY 180 tablet 0   • cyanocobalamin 1000 MCG/ML injection ADMINISTER 1 ML UNDER THE SKIN ONCE WEEKLY     • tolterodine (DETROL LA) 4 MG 24 hr capsule TAKE 1 CAPSULE BY MOUTH DAILY 30 capsule  11   • meclizine (ANTIVERT) 25 MG tablet TAKE 1 TABLET BY MOUTH THREE TIMES DAILY AS NEEDED FOR VERTIGO 30 tablet 2   • omeprazole (PriLOSEC) 40 MG capsule Take 1 capsule by mouth twice daily. 180 capsule 3   • buPROPion XL (WELLBUTRIN XL) 300 MG 24 hr tablet Take 300 mg by mouth daily.     • amphetamine-dextroamphetamine (ADDERALL) 20 MG tablet Take 1 tablet by mouth 2 times daily.     • metFORMIN (GLUCOPHAGE-XR) 500 MG 24 hr tablet TAKE 2 TABLETS BY MOUTH DAILY WITH BREAKFAST (Patient taking differently: Take 500 mg by mouth daily (with breakfast). Pt saw PCP recently changed order to 500mg daily) 180 tablet 3   • Magnesium-Zinc 133.33-5 MG Tab Take 1 tablet by mouth daily.      • Ferrous Sulfate (IRON) 325 (65 Fe) MG Tab Take 1 tablet by mouth daily.  30 tablet 0   • clonazePAM (KLONOPIN) 0.5 MG tablet Take 0.5 mg by mouth 2 times daily as needed.   1   • escitalopram (LEXAPRO) 20 MG tablet Take 1 Tab by mouth every morning. - Oral     • aspirin 81 MG EC tablet Take 81 mg by mouth daily.        No current facility-administered medications for this visit.       Allergies:  ALLERGIES:   Allergen Reactions   • Mupirocin RASH       BP results forwarded to patients physician for review. Tigist Jones was advised we would call she if any further orders.    Best way to contact patient: [phone]    Electronically Signed by:   Rosio Laurent CMA 9/22/2023       Normal for race

## 2024-01-01 NOTE — ED ADULT NURSE NOTE - NS ED PATIENT SAFETY CONCERN
[facility]  PICU DAILY PROGRESS NOTE    ADMISSION DATE:  2024  DATE:  2024  CURRENT HOSPITAL DAY:  Hospital Day: 2     CHIEF COMPLAINT:    Chelsey Worley is a 8 month old female pmhx 37w6 with NICU stay requiring intubation and CPR at birth, immunized, small ASD and PFO presenting with 3 days of fever, cough, congestion, and increased WOB admitted for acute respiratory failure 2/2 to viral bronchiolitis in the setting of RSV and influenza A.     INTERVAL HISTORY:    - continued on HFNC 2 L/kg (20L) fio2 40-50% ; currently 50%, tachypneic   - suctioning q1 copious, tenacious secretion burden  - no stridor at rest   - febrile to 39.3 C  - Fluid Balance +61    OBJECTIVE:    VITAL SIGNS:     Vital Last Value 24 Hour Range   Temperature 97.9 °F (36.6 °C) (12/16/24 0400) Temp  Min: 97.3 °F (36.3 °C)  Max: 103.3 °F (39.6 °C)   Pulse 137 (12/16/24 0700) Pulse  Min: 124  Max: 183   Respiratory (!) 55 (12/16/24 0700) Resp  Min: 26  Max: 74   Non-Invasive  Blood Pressure (!) 119/67 (12/16/24 0400) BP  Min: 84/67  Max: 124/100   Pulse Oximetry 90 % (12/16/24 0700) SpO2  Min: 86 %  Max: 100 %     Vital Today Admitted   Weight 10.5 kg (23 lb 1 oz) (12/15/24 1540) Weight: 10.6 kg (23 lb 4.5 oz) (12/15/24 1205)   Height N/A Length: 26.38\" (67 cm) (12/15/24 1540)   Body Mass Index N/A BMI (Calculated): 23.3 (12/15/24 1540)     INTAKE/OUTPUT:    Date 12/15/24 0700 - 12/16/24 0659 12/16/24 0700 - 12/17/24 0659   Shift 4321-8701 2437-0745 9365-3643 24 Hour Total 4592-2647 1282-0867 4160-2448 24 Hour Total   INTAKE   P.O.(mL/kg)  390(37.29)  390(37.29)       Shift Total(mL/kg)  390(37.29)  390(37.29)       OUTPUT   Urine(mL/kg/hr)  97(1.16) 232(2.77) 329(1.31)       Shift Total(mL/kg)  97(9.27) 232(22.18) 329(31.45)       Weight (kg) 10.56 10.46 10.46 10.46 10.46 10.46 10.46 10.46         PHYSICAL EXAM:    Physical Exam  Vitals reviewed.   Constitutional:       General: She is active.       Appearance: Normal appearance.   HENT:      Head: Normocephalic and atraumatic. Anterior fontanelle is flat.      Right Ear: External ear normal.      Left Ear: External ear normal.      Nose: Congestion and rhinorrhea present.      Mouth/Throat:      Mouth: Mucous membranes are moist.      Pharynx: No oropharyngeal exudate or posterior oropharyngeal erythema.   Eyes:      General:         Left eye: No discharge.      Conjunctiva/sclera: Conjunctivae normal.      Pupils: Pupils are equal, round, and reactive to light.   Cardiovascular:      Rate and Rhythm: Regular rhythm. Tachycardia present.   Pulmonary:      Effort: Tachypnea and retractions present.      Breath sounds: No wheezing.      Comments: mild subcostal retractions, on HFNC, no focal consolidations, no wheezing, no stridor heard at rest  Abdominal:      General: Abdomen is flat. Bowel sounds are normal. There is no distension.      Tenderness: There is no abdominal tenderness.   Genitourinary:     General: Normal vulva.   Musculoskeletal:         General: Normal range of motion.      Cervical back: Normal range of motion.   Skin:     General: Skin is warm.      Capillary Refill: Capillary refill takes less than 2 seconds.      Turgor: Normal.   Neurological:      General: No focal deficit present.      Mental Status: She is alert.      Primitive Reflexes: Suck normal. Symmetric Janel.         RESPIRATORY SUPPORT:  Oxygen Therapy  O2 Device: High flow nasal cannula  O2 Flow Rate (L/min): 20 L/min  FiO2 (%): 50 %  [unfilled]    LABORATORY DATA:    Recent Results (from the past 24 hour(s))   Rapid Respiratory Pathogen PCR    Collection Time: 12/15/24 12:39 PM    Specimen: Nasopharyngeal Swab   Result Value Ref Range    Adenovirus Not Detected Not Detected    Bordetella Parapertussis Not Detected Not Detected    Bordetella pertussis Not Detected Not Detected    Chlamydophila pneumoniae Not Detected Not Detected    Coronavirus, 229E Not Detected Not  Detected    Coronavirus, HKU1 Not Detected Not Detected    Coronavirus, NL63 Not Detected Not Detected    Coronavirus, OC43 Not Detected Not Detected    Influenza B Not Detected Not Detected    Influenza A, 2009 H1N1 Subtype Detected (A) Not Detected    Metapneumovirus Not Detected Not Detected    Mycoplasma pneumoniae Not Detected Not Detected    Parainfluenza 1 Not Detected Not Detected    Parainfluenza 2 Not Detected Not Detected    Parainfluenza 3 Not Detected Not Detected    Parainfluenza 4 Not Detected Not Detected    Respiratory Syncytial virus Detected (A) Not Detected    Rhinovirus/Enterovirus Not Detected Not Detected    SARS-CoV-2 by PCR Not Detected Not Detected / Detected / Inhibitors Present   ]       IMAGING STUDIES:    XR CHEST AP OR PA    Result Date: 2024  EXAMINATION: XR CHEST AP OR PA  EXAM DATE: 2024 1:16 PM CLINICAL HISTORY: 8 months Female  eval for PNA  COMPARISON: 2024 FINDINGS: Normal cardiomediastinal silhouette. Mild hazy right parahilar and lower lung opacities.  No pleural effusion or pneumothorax is seen. Upper abdomen is unremarkable. Skeletal structures appear normal.  There is no displaced fracture.     Findings are subtle, however compatible with early right lower lung pneumonia.. Consider two-view follow-up if concern persists. Electronically Signed by: SKYLA HUBER M.D. Signed on: 2024 1:41 PM Workstation ID: [id number]      Imaging studies reviewed.           MEDICATIONS:    Current Facility-Administered Medications   Medication Dose Route Frequency Provider Last Rate Last Admin    dextrose 5 % / sodium chloride 0.9% infusion   Intravenous Continuous Rosita Lopez MD         Current Facility-Administered Medications   Medication Dose Route Frequency Provider Last Rate Last Admin       ACTIVE PROBLEMS:    Active Hospital Problems    Diagnosis     Bronchiolitis                         ASSESSMENT:     Assessment:  Chelsey Worley is a 8  month old female pmhx 37w6 with NICU stay requiring intubation and CPR at birth, immunized, concern for HIE/ pHTN at birth, small ASD and PFO at birth presenting with 3 days of fever, cough, congestion, and increased WOB admitted for acute respiratory failure 2/2 to viral bronchiolitis in the setting of RSV and influenza. Low concern for sepsis at this time. No focal lung findings concerning for bacterial pneumonia, no abx indicated. Patient to remain in PICU while at risk for cardiorespiratory decompensation.     Plan:  Neuro  - CAPD q12  - tylenol 15 mg/kg q6 prn     Cardiovascular  - Continue cardiovascular monitoring      Respiratory  - HFNC 2L/kg fio2 50%, goal sats >90% and >88% when sleeping  - Suction-copious, tenacious secretion burden.   - consider PICU-level chest clearance and NIV-PC if needed to avoid worsening respiratory failure  - continuous pulse ox     FEN/GI  - POAL BM and formula   - D5NS w Kcl at mIVFs only if poor PO  - S/p NS bolus     ID  - Isolation per protocol  - Contact/ droplet      VTE SCORE 2 (PICU, bolus)  SCDs none  LINES: PIV The use, function and necessity of all lines and invasive devices was discussed on rounds     PICU UP LEVEL  Level 3: Level 1 and 2 activities plus OOB to chair TID or sitting up in bed TID if appropriate chair is not available; ambulate BID if trunk control present   ERT NA        Dispo: Pt to remain in PICU while requiring >50% fio2 and continues > HFNC 2L/kg     Plan was discussed with family, nursing staff, and PICU Attending. All in agreement with the plan.     Seema Velasquez DO PGY-3  [facility] Pediatric Resident   Please contact via [employer]   2024 3:13 PM   12/16/24 7:57 AM                         No

## 2024-06-05 PROBLEM — Z78.9 OTHER SPECIFIED HEALTH STATUS: Chronic | Status: ACTIVE | Noted: 2022-08-31

## 2024-06-05 NOTE — ED ADULT TRIAGE NOTE - MODE OF ARRIVAL
Diagnosis:   Memory difficulty [R41.3]      Referring Provider: Viraj Good  Date of Evaluation:   4/17/2024    Precautions:   Cognition Next MD visit:   none scheduled  Date of Surgery: n/a   Insurance Primary/Secondary: BCBS OUT OF STATE / N/A       # Auth Visits: 12   Total Timed Treatment: 45 min  Date POC Expires: 7/16/2024  Total Treatment time: 45 min  Charges: [id number]   Treatment Number: 11    Subjective: Patient arrived on time to session. Patient participated actively in therapeutic tasks.    Pain: No pain reported on this date. Patient not being seen for pain.    Objective:  Goals: (to be met in 12 visits)   STG 1: Patient will independently verbalize compensatory memory strategies (processing, working memory, short-term memory, attention, problem-solving) and describe 1 use per strategy.  Progress: Patient verbalized understanding of compensatory strategies and external supports given intermittent cueing.     STG 2: Patient will recall novel paragraph length stimuli with 90% accuracy following a 20 minute delay, with min verbal and visual cues for use of compensatory memory strategies.  Progress: 85% following 15 minute delay given mod visual cues as patient was utilizing notes. Patient continues to benefit from cueing to support complex encoding of information.     STG 3: Patient will accurately complete working memory tasks within 90% of opportunities given min verbal and visual cueing.  Progress: 90% given mod verbal and visual cueing. Patient increased use of working memory for functional opportunities within structured environment.     STG 4: Patient will utilize accurate planning and note-taking skills within 90% of opportunities given min verbal and visual cueing.  Progress: Patient utilized planning and note-taking skills within 90% of opportunities given min verbal and visual cues. Goal met; to gauge carryover in next session.    STG 5B: Patient will divide attention between two higher level  complexity tasks given less than 1 verbal redirections/reminders.   Progress: Required average of 2 verbal redirections/reminders.     HEP: External memory strategies  Education: Complex encoding of information    Assessment: Patient presents with cognitive deficits within the following areas: memory (working, recall), attention, and executive functioning. Patient's deficits impact his ability to transition to work tasks of increased complexity and follow through with home tasks. Patient has demonstrated progress toward goals within structured sessions. Patient requires cueing and support for tasks of increased complexity and to support generalization to home tasks. Continued intervention is medically necessary.       Plan: Continue speech-language therapy targeting cognitive-communication.    Walk in Other

## 2024-06-06 ENCOUNTER — APPOINTMENT (OUTPATIENT)
Dept: ORTHOPEDIC SURGERY | Facility: CLINIC | Age: 54
End: 2024-06-06
Payer: COMMERCIAL

## 2024-06-06 VITALS — HEIGHT: 64 IN | WEIGHT: 195 LBS | BODY MASS INDEX: 33.29 KG/M2

## 2024-06-06 DIAGNOSIS — E11.9 TYPE 2 DIABETES MELLITUS W/OUT COMPLICATIONS: ICD-10-CM

## 2024-06-06 DIAGNOSIS — M46.1 SACROILIITIS, NOT ELSEWHERE CLASSIFIED: ICD-10-CM

## 2024-06-06 DIAGNOSIS — M54.12 RADICULOPATHY, CERVICAL REGION: ICD-10-CM

## 2024-06-06 DIAGNOSIS — M62.838 OTHER MUSCLE SPASM: ICD-10-CM

## 2024-06-06 DIAGNOSIS — S33.5XXA SPRAIN OF LIGAMENTS OF LUMBAR SPINE, INITIAL ENCOUNTER: ICD-10-CM

## 2024-06-06 PROCEDURE — 72050 X-RAY EXAM NECK SPINE 4/5VWS: CPT

## 2024-06-06 PROCEDURE — 99204 OFFICE O/P NEW MOD 45 MIN: CPT

## 2024-06-06 PROCEDURE — 72110 X-RAY EXAM L-2 SPINE 4/>VWS: CPT

## 2024-06-06 RX ORDER — IBUPROFEN 800 MG/1
800 TABLET, FILM COATED ORAL 3 TIMES DAILY
Qty: 60 | Refills: 0 | Status: ACTIVE | COMMUNITY
Start: 2024-06-06 | End: 1900-01-01

## 2024-06-06 RX ORDER — METHYLPREDNISOLONE 4 MG/1
4 TABLET ORAL
Qty: 1 | Refills: 0 | Status: ACTIVE | COMMUNITY
Start: 2024-06-06 | End: 1900-01-01

## 2024-06-06 NOTE — RETURN TO WORK/SCHOOL
[Return Date: _____] : as of [unfilled].  This has been discussed in detail with ~Yuli~ and ~he/she~ understands this. [Full Duty] : full duty

## 2024-06-06 NOTE — IMAGING
[Straightening consistent with spasm] : Straightening consistent with spasm [Disc space narrowing] : Disc space narrowing [de-identified] : CSPINE Inspection: No rash or ecchymosis Palpation: spasm and TTP in traps, rhomboids, paracervicals ROM: Limited all planes Strength: 5/5 bilateral deltoid, biceps, triceps, wrist flexors, wrist extensors, , abductors Sensation: Sensation present to light touch bilateral C5-T1 distributions Reflexes: Negative Escoto's bilaterally  LSPINE Inspection: No rash or ecchymosis Palpation: B/L SIJ TTP ROM: limited  Strength: nonfocal
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
## 2024-08-01 ENCOUNTER — APPOINTMENT (OUTPATIENT)
Dept: ORTHOPEDIC SURGERY | Facility: CLINIC | Age: 54
End: 2024-08-01
Payer: COMMERCIAL

## 2024-08-01 PROCEDURE — 99215 OFFICE O/P EST HI 40 MIN: CPT

## 2024-08-01 NOTE — ASSESSMENT
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  Advised to clear medication with internist or PCP if any concurrent health problem with heart, blood pressure, or GI system exists. Gabapentin- Patient advised of sedating effects, instructed not to drive, operate machinery, or take with other sedating medications. Advised of need to taper on/off medication and risk of abruptly stopping gabapentin.  Discussed lumbar surgery

## 2024-08-01 NOTE — IMAGING
[Straightening consistent with spasm] : Straightening consistent with spasm [Disc space narrowing] : Disc space narrowing [de-identified] : CSPINE Inspection: No rash or ecchymosis Palpation: spasm and TTP in traps, rhomboids, paracervicals ROM: Limited all planes Strength: 5/5 bilateral deltoid, biceps, triceps, wrist flexors, wrist extensors, , abductors Sensation: Sensation present to light touch bilateral C5-T1 distributions Reflexes: Negative Escoto's bilaterally  LSPINE Palpation: B/L SIJ TTP ROM: limited  Strength: 5/5 + L SLR
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
## 2024-08-04 PROBLEM — M51.26 LUMBAR HERNIATED DISC: Status: ACTIVE | Noted: 2024-08-01

## 2024-08-05 ENCOUNTER — APPOINTMENT (OUTPATIENT)
Dept: PAIN MANAGEMENT | Facility: CLINIC | Age: 54
End: 2024-08-05

## 2024-08-05 PROBLEM — Z86.79 HISTORY OF SUBDURAL HEMATOMA: Status: RESOLVED | Noted: 2024-08-05 | Resolved: 2024-08-05

## 2024-08-05 PROBLEM — Z83.3 FAMILY HISTORY OF DIABETES MELLITUS: Status: ACTIVE | Noted: 2024-08-05

## 2024-08-05 PROCEDURE — 99204 OFFICE O/P NEW MOD 45 MIN: CPT

## 2024-08-05 RX ORDER — GABAPENTIN 100 MG/1
100 CAPSULE ORAL
Qty: 60 | Refills: 2 | Status: ACTIVE | COMMUNITY
Start: 2024-08-01 | End: 1900-01-01

## 2024-08-05 NOTE — HISTORY OF PRESENT ILLNESS
[Neck] : neck [Lower back] : lower back [Left Arm] : left arm [Left Leg] : left leg [8] : 8 [Dull/Aching] : dull/aching [Radiating] : radiating [Sharp] : sharp [Shooting] : shooting [Tightness] : tightness [Tingling] : tingling [Constant] : constant [Household chores] : household chores [Leisure] : leisure [Sleep] : sleep [Meds] : meds [Physical therapy] : physical therapy [Sitting] : sitting [Standing] : standing [Walking] : walking [] : no [FreeTextEntry1] : L shoulder  [FreeTextEntry6] : numbness and tingling in L hand and foot  [FreeTextEntry7] : L leg and arm  [de-identified] : x-ray and MRI

## 2024-08-05 NOTE — DISCUSSION/SUMMARY
[de-identified] : NF - DOA 6/1/2024  JUAN PABLO CASTELLANOS is a 54 year-old woman presenting for a NPV for a history of chronic neck and low back pain.   Prior treatment: Physical therapy x4 weeks Chiropractor Acupuncture Ibuprofen 800mg prn Oral steroid taper Patient has participated and failed at least 6 weeks of conservative therapy including physical therapy and a prescribed home exercise program as well as 6 weeks of activity modifications, including heat, ice, rest, and over the counter medications.  Plan:  1) MRI cervical and lumbar spine images reviewed with the patient. 2) Discussed a LEFT L4-L5, L5-S1 TFESI. The procedure was explained to the patient in detail. Reviewed risks, benefits, and alternatives with the patient. Some risks discussed included temporary increase in pain, bleeding, infection, and side effects from steroids. The patient expressed understanding and would like to defer for now. 3) Trial gabapentin 200mg qhs; Discussed AEs, including sedation, dizziness, somnolence, depression. Patient told to use caution when driving or working after taking the first few doses. 4) Trial diclofenac 75mg BID prn; Discussed the risks of NSAIDs, including worsening HTN, cardiovascular risks, GI risk, renal injury. The patient was told not to take other NSAIDs with this and to consult with their PCP if there is a significant medical history to warrant this prior to initiating treatment.  5) Continue physical therapy 6) RTC 6 weeks

## 2024-08-05 NOTE — DATA REVIEWED
[MRI] : MRI [Cervical Spine] : cervical spine [Report was reviewed and noted in the chart] : The report was reviewed and noted in the chart [I independently reviewed and interpreted images and report] : I independently reviewed and interpreted images and report [FreeTextEntry1] : 6/21/2024 MRI Cervical Spine ([facility]) FINDINGS: There is kyphotic angulation of the cervical spine.  The vertebral bodies outline normally. The marrow signal demonstrates no evidence of an infiltrative or destructive marrow process. The cervical spinal cord demonstrates a normal course, caliber, and signal intensity. The visualized paravertebral soft tissues are grossly unremarkable.  C2-3 and C3-4: No evidence of disc herniation. The neural foramina are patent. The facet joints are within normal limits.  C4-5: Disc bulging exerting pressure on the thecal sac. The neural foramina are patent. The facet joints are within normal limits.  C5-6: Disc herniation exerting pressure on the thecal sac. The neural foramina are patent. The facet joints are within normal limits.  C6-7: No evidence of disc herniation. The neural foramina are patent. The facet joints are within normal limits.  C7-T1: No evidence of disc herniation. The neural foramina are patent. The facet joints are within normal limits.  Disc bulges and/or herniations in the visualized thoracic spine. At the T2-T3 level, the disc may be contacting the spinal cord. This is evaluated in the sagittal plane only.  IMPRESSION:   1.  Disc herniation exerting pressure on the thecal sac, C5-6.  2.  Disc bulging, C4-5.  3.  There is kyphotic angulation of the cervical spine suggesting muscle spasm.  4.  Disc pathology in the visualized thoracic spine. At the T2-T3 level, the disc may be contacting the spinal cord. This is incompletely evaluated on this study. An examination of the thoracic spine can be performed as clinically appropriate.  6/21/2024 MRI Lumbar Spine ([facility]) FINDINGS: The marrow signal does not demonstrate any evidence of a destructive or infiltrative marrow process. The conus medullaris outlines normally. The visualized paravertebral soft tissues are grossly unremarkable. The lumbar spine maintains its normal lordotic curvature.   L1-L2: Disc bulging exerting pressure on the thecal sac. The neural foramina are patent. The facet joints are within normal limits.  L2-L3: No evidence of disc herniation. The neural foramina are patent. Mild facet hypertrophy.  L3-L4: Concentric disc bulging exerting pressure on the thecal sac. Facet hypertrophy more prominent on the right.  L4-L5: Broad-based disc herniation more prominent centrally with an annular tear and extending into the neural foramina bilaterally indenting the thecal sac and contacting the forming L5 and exiting L4 nerve roots. The disc migrates slightly inferiorly. Facet hypertrophy more prominent on the right. Moderate foraminal and mild to moderate central canal stenosis.  L5-S1: Disc herniation with an annular tear more prominent centrally and extending into the neural foramina bilaterally contacting the thecal sac, S1 and exiting L5 nerve roots. Moderate foraminal stenosis bilaterally. Facet hypertrophy.  IMPRESSION:  1.  Disc herniation with an annular tear migrating inferiorly contacting the forming L5 and exiting L4 nerve roots, L4-L5. Moderate foraminal and mild to moderate central canal stenosis.  2.  Disc herniation with an annular tear contacting the S1 and exiting L5 nerve roots, L5-S1. Moderate foraminal stenosis.  3.  Disc bulging, L1-L2 and L3-L4.

## 2024-08-05 NOTE — PHYSICAL EXAM
[de-identified] : Gen: NAD Neck: +spurling's on the LEFT Head: NC/AT Eyes: no glasses, no scleral icterus ENT: mucous membranes moist CV: RRR, S1 S2, no mrg Lungs: CTAB, nonlabored breathing Abd: soft, NT/ND Ext: full ROM in all extremities, no peripheral edema Back: +SLR on the LEFT, +facet loading on the LEFT, +TTP in the bilateral low lumbar facet region L > R Neuro: CN intact UEs +5 L +5 R shoulder abduction +5 L +5 R arm abduction +5 L +5 R forearm flexion +5 L +5 R forearm extension +5 L +5 R finger flexion +5 L +5 R  strength LEs +5 L +5 R hip flexion +5 L +5 R leg extension +5 L +5 R leg flexion +5 L +5 R foot dorsiflexion +5 L +5 R foot plantarflexion +5 L +5 R EHL extension Psych: normal affect Skin: no visible lesions

## 2024-08-05 NOTE — HISTORY OF PRESENT ILLNESS
[Neck] : neck [Lower back] : lower back [Left Arm] : left arm [Left Leg] : left leg [8] : 8 [Dull/Aching] : dull/aching [Radiating] : radiating [Sharp] : sharp [Shooting] : shooting [Tightness] : tightness [Tingling] : tingling [Constant] : constant [Household chores] : household chores [Leisure] : leisure [Sleep] : sleep [Meds] : meds [Physical therapy] : physical therapy [Sitting] : sitting [Standing] : standing [Walking] : walking [] : no [FreeTextEntry1] : L shoulder  [FreeTextEntry6] : numbness and tingling in L hand and foot  [FreeTextEntry7] : L leg and arm  [de-identified] : x-ray and MRI

## 2024-08-05 NOTE — DISCUSSION/SUMMARY
[de-identified] : NF - DOA 6/1/2024  JUAN PABLO CASTELLANOS is a 54 year-old woman presenting for a NPV for a history of chronic neck and low back pain.   Prior treatment: Physical therapy x4 weeks Chiropractor Acupuncture Ibuprofen 800mg prn Oral steroid taper Patient has participated and failed at least 6 weeks of conservative therapy including physical therapy and a prescribed home exercise program as well as 6 weeks of activity modifications, including heat, ice, rest, and over the counter medications.  Plan:  1) MRI cervical and lumbar spine images reviewed with the patient. 2) Discussed a LEFT L4-L5, L5-S1 TFESI. The procedure was explained to the patient in detail. Reviewed risks, benefits, and alternatives with the patient. Some risks discussed included temporary increase in pain, bleeding, infection, and side effects from steroids. The patient expressed understanding and would like to defer for now. 3) Trial gabapentin 200mg qhs; Discussed AEs, including sedation, dizziness, somnolence, depression. Patient told to use caution when driving or working after taking the first few doses. 4) Trial diclofenac 75mg BID prn; Discussed the risks of NSAIDs, including worsening HTN, cardiovascular risks, GI risk, renal injury. The patient was told not to take other NSAIDs with this and to consult with their PCP if there is a significant medical history to warrant this prior to initiating treatment.  5) Continue physical therapy 6) RTC 6 weeks

## 2024-08-05 NOTE — HISTORY OF PRESENT ILLNESS
[Neck] : neck [Lower back] : lower back [Left Arm] : left arm [Left Leg] : left leg [8] : 8 [Dull/Aching] : dull/aching [Radiating] : radiating [Sharp] : sharp [Shooting] : shooting [Tightness] : tightness [Tingling] : tingling [Constant] : constant [Household chores] : household chores [Leisure] : leisure [Sleep] : sleep [Meds] : meds [Physical therapy] : physical therapy [Sitting] : sitting [Standing] : standing [Walking] : walking [] : no [FreeTextEntry1] : L shoulder  [FreeTextEntry6] : numbness and tingling in L hand and foot  [FreeTextEntry7] : L leg and arm  [de-identified] : x-ray and MRI

## 2024-08-05 NOTE — DISCUSSION/SUMMARY
[de-identified] : NF - DOA 6/1/2024  JUAN PABLO CASTELLANOS is a 54 year-old woman presenting for a NPV for a history of chronic neck and low back pain.   Prior treatment: Physical therapy x4 weeks Chiropractor Acupuncture Ibuprofen 800mg prn Oral steroid taper Patient has participated and failed at least 6 weeks of conservative therapy including physical therapy and a prescribed home exercise program as well as 6 weeks of activity modifications, including heat, ice, rest, and over the counter medications.  Plan:  1) MRI cervical and lumbar spine images reviewed with the patient. 2) Discussed a LEFT L4-L5, L5-S1 TFESI. The procedure was explained to the patient in detail. Reviewed risks, benefits, and alternatives with the patient. Some risks discussed included temporary increase in pain, bleeding, infection, and side effects from steroids. The patient expressed understanding and would like to defer for now. 3) Trial gabapentin 200mg qhs; Discussed AEs, including sedation, dizziness, somnolence, depression. Patient told to use caution when driving or working after taking the first few doses. 4) Trial diclofenac 75mg BID prn; Discussed the risks of NSAIDs, including worsening HTN, cardiovascular risks, GI risk, renal injury. The patient was told not to take other NSAIDs with this and to consult with their PCP if there is a significant medical history to warrant this prior to initiating treatment.  5) Continue physical therapy 6) RTC 6 weeks

## 2024-08-05 NOTE — PHYSICAL EXAM
[de-identified] : Gen: NAD Neck: +spurling's on the LEFT Head: NC/AT Eyes: no glasses, no scleral icterus ENT: mucous membranes moist CV: RRR, S1 S2, no mrg Lungs: CTAB, nonlabored breathing Abd: soft, NT/ND Ext: full ROM in all extremities, no peripheral edema Back: +SLR on the LEFT, +facet loading on the LEFT, +TTP in the bilateral low lumbar facet region L > R Neuro: CN intact UEs +5 L +5 R shoulder abduction +5 L +5 R arm abduction +5 L +5 R forearm flexion +5 L +5 R forearm extension +5 L +5 R finger flexion +5 L +5 R  strength LEs +5 L +5 R hip flexion +5 L +5 R leg extension +5 L +5 R leg flexion +5 L +5 R foot dorsiflexion +5 L +5 R foot plantarflexion +5 L +5 R EHL extension Psych: normal affect Skin: no visible lesions

## 2024-08-05 NOTE — PHYSICAL EXAM
[de-identified] : Gen: NAD Neck: +spurling's on the LEFT Head: NC/AT Eyes: no glasses, no scleral icterus ENT: mucous membranes moist CV: RRR, S1 S2, no mrg Lungs: CTAB, nonlabored breathing Abd: soft, NT/ND Ext: full ROM in all extremities, no peripheral edema Back: +SLR on the LEFT, +facet loading on the LEFT, +TTP in the bilateral low lumbar facet region L > R Neuro: CN intact UEs +5 L +5 R shoulder abduction +5 L +5 R arm abduction +5 L +5 R forearm flexion +5 L +5 R forearm extension +5 L +5 R finger flexion +5 L +5 R  strength LEs +5 L +5 R hip flexion +5 L +5 R leg extension +5 L +5 R leg flexion +5 L +5 R foot dorsiflexion +5 L +5 R foot plantarflexion +5 L +5 R EHL extension Psych: normal affect Skin: no visible lesions

## 2024-09-12 ENCOUNTER — APPOINTMENT (OUTPATIENT)
Dept: ORTHOPEDIC SURGERY | Facility: CLINIC | Age: 54
End: 2024-09-12
Payer: COMMERCIAL

## 2024-09-12 DIAGNOSIS — S33.5XXA SPRAIN OF LIGAMENTS OF LUMBAR SPINE, INITIAL ENCOUNTER: ICD-10-CM

## 2024-09-12 DIAGNOSIS — M46.1 SACROILIITIS, NOT ELSEWHERE CLASSIFIED: ICD-10-CM

## 2024-09-12 DIAGNOSIS — M62.838 OTHER MUSCLE SPASM: ICD-10-CM

## 2024-09-12 DIAGNOSIS — M51.26 OTHER INTERVERTEBRAL DISC DISPLACEMENT, LUMBAR REGION: ICD-10-CM

## 2024-09-12 PROCEDURE — 99215 OFFICE O/P EST HI 40 MIN: CPT

## 2024-09-12 NOTE — IMAGING
[Straightening consistent with spasm] : Straightening consistent with spasm [Disc space narrowing] : Disc space narrowing [de-identified] : CSPINE Inspection: No rash or ecchymosis Palpation: spasm and TTP in traps, rhomboids, paracervicals ROM: Limited all planes Strength: 5/5 bilateral deltoid, biceps, triceps, wrist flexors, wrist extensors, , abductors Sensation: Sensation present to light touch bilateral C5-T1 distributions Reflexes: Negative Escoto's bilaterally  LSPINE Palpation: B/L SIJ TTP ROM: limited  Strength: 5/5 + L SLR   [No instability seen on flexion/extension] : No instability seen on flexion/extension

## 2024-09-12 NOTE — ASSESSMENT
[FreeTextEntry1] : No cervical HNP  L4/5 HNP L paracentral with LR stenosis; L5/S1 bulge with abutment of b/l traversing roots and NF narrowing  Has failed extensive conservative measures including PT, medications, not interested in LESI, is interested in more definitive interventions. Indicating for laminectomy L4-S1 to decompress the neural elements Laminectomy- We've discussed the surgery details including but not limited to pain, scar, bleeding, and infection. There is also a possibility for complications such as failure or fracture of bone requiring instrumentation and fusion, and need for future surgery. There is also a possibility for recurrent or residual stenosis or disc herniation. Finally, we discussed potential for injury to nerves, the spinal cord either transient or permanent, damage to blood vessels, CSF leak, blindness, need for transfusion, and medical complications. The patient verbalized understanding and all questions were answered.   NSAIDs- Patient warned of risk of medication to GI tract, increased blood pressure, cardiac risk, and risk of fluid retention.  Advised to clear medication with internist or PCP if any concurrent health problem with heart, blood pressure, or GI system exists.  Gabapentin- Patient advised of sedating effects, instructed not to drive, operate machinery, or take with other sedating medications. Advised of need to taper on/off medication and risk of abruptly stopping gabapentin.

## 2024-09-16 ENCOUNTER — APPOINTMENT (OUTPATIENT)
Dept: PAIN MANAGEMENT | Facility: CLINIC | Age: 54
End: 2024-09-16

## 2024-09-16 NOTE — HISTORY OF PRESENT ILLNESS
What Type Of Note Output Would You Prefer (Optional)?: Bullet Format Hpi Title: Evaluation of Skin Lesions How Severe Are Your Spot(S)?: mild [Neck] : neck [Lower back] : lower back [Left Arm] : left arm [Left Leg] : left leg [8] : 8 [Dull/Aching] : dull/aching [Radiating] : radiating [Sharp] : sharp [Shooting] : shooting [Tightness] : tightness [Tingling] : tingling [Constant] : constant [Household chores] : household chores [Leisure] : leisure [Sleep] : sleep [Meds] : meds [Physical therapy] : physical therapy [Sitting] : sitting [Standing] : standing [Walking] : walking [] : no [FreeTextEntry1] : L shoulder  [FreeTextEntry6] : numbness and tingling in L hand and foot  [FreeTextEntry7] : L leg and arm  [de-identified] : x-ray and MRI

## 2024-09-16 NOTE — PHYSICAL EXAM
[de-identified] : Gen: NAD Neck: +spurling's on the LEFT Head: NC/AT Eyes: no glasses, no scleral icterus ENT: mucous membranes moist CV: RRR, S1 S2, no mrg Lungs: CTAB, nonlabored breathing Abd: soft, NT/ND Ext: full ROM in all extremities, no peripheral edema Back: +SLR on the LEFT, +facet loading on the LEFT, +TTP in the bilateral low lumbar facet region L > R Neuro: CN intact UEs +5 L +5 R shoulder abduction +5 L +5 R arm abduction +5 L +5 R forearm flexion +5 L +5 R forearm extension +5 L +5 R finger flexion +5 L +5 R  strength LEs +5 L +5 R hip flexion +5 L +5 R leg extension +5 L +5 R leg flexion +5 L +5 R foot dorsiflexion +5 L +5 R foot plantarflexion +5 L +5 R EHL extension Psych: normal affect Skin: no visible lesions

## 2024-09-16 NOTE — DISCUSSION/SUMMARY
[de-identified] : NF - DOA 6/1/2024  JUAN PABLO CASTELLANOS is a 54 year-old woman presenting for a RPV for a history of chronic neck and low back pain.   Prior treatment: Physical therapy x4 weeks Chiropractor Acupuncture Ibuprofen 800mg prn Oral steroid taper Patient has participated and failed at least 6 weeks of conservative therapy including physical therapy and a prescribed home exercise program as well as 6 weeks of activity modifications, including heat, ice, rest, and over the counter medications.  Plan:  1) MRI cervical and lumbar spine images reviewed with the patient. 2) Discussed a LEFT L4-L5, L5-S1 TFESI. The procedure was explained to the patient in detail. Reviewed risks, benefits, and alternatives with the patient. Some risks discussed included temporary increase in pain, bleeding, infection, and side effects from steroids. The patient expressed understanding and would like to defer for now. 3) Trial gabapentin 200mg qhs; Discussed AEs, including sedation, dizziness, somnolence, depression. Patient told to use caution when driving or working after taking the first few doses. 4) Trial diclofenac 75mg BID prn; Discussed the risks of NSAIDs, including worsening HTN, cardiovascular risks, GI risk, renal injury. The patient was told not to take other NSAIDs with this and to consult with their PCP if there is a significant medical history to warrant this prior to initiating treatment.  5) Continue physical therapy 6) RTC 6 weeks

## 2024-10-30 NOTE — ED PROVIDER NOTE - IV ALTEPLASE EXCL ABS HIDDEN
distension.      Palpations: Abdomen is soft.      Tenderness: There is no abdominal tenderness.   Musculoskeletal:      Cervical back: Normal range of motion and neck supple. No rigidity or tenderness.      Right lower leg: No edema.      Left lower leg: No edema.   Lymphadenopathy:      Cervical: No cervical adenopathy.   Skin:     General: Skin is warm.      Findings: No bruising, erythema or rash.   Neurological:      General: No focal deficit present.      Mental Status: He is alert and oriented to person, place, and time. Mental status is at baseline.      Cranial Nerves: No cranial nerve deficit.      Gait: Gait normal.   Psychiatric:         Mood and Affect: Mood normal.         Behavior: Behavior normal. Behavior is cooperative.         Thought Content: Thought content normal.         Judgment: Judgment normal.       An electronic signature was used to authenticate this note.  ARTURO RODRIGUEZ DO  Elements of this note have been dictated via voice recognition software.  Text and phrases may be limited by the accuracy and autoconversion of the software.  The chart has been reviewed, but errors may still be present.  Medical student present during office visit, Palmer Colon, OMS-III   show

## 2024-12-26 ENCOUNTER — EMERGENCY (EMERGENCY)
Facility: HOSPITAL | Age: 54
LOS: 0 days | Discharge: HOME | End: 2024-12-26
Attending: STUDENT IN AN ORGANIZED HEALTH CARE EDUCATION/TRAINING PROGRAM
Payer: COMMERCIAL

## 2024-12-26 VITALS
TEMPERATURE: 97 F | RESPIRATION RATE: 18 BRPM | HEART RATE: 68 BPM | DIASTOLIC BLOOD PRESSURE: 85 MMHG | OXYGEN SATURATION: 98 % | SYSTOLIC BLOOD PRESSURE: 142 MMHG

## 2024-12-26 VITALS
SYSTOLIC BLOOD PRESSURE: 130 MMHG | RESPIRATION RATE: 16 BRPM | HEIGHT: 64 IN | TEMPERATURE: 98 F | DIASTOLIC BLOOD PRESSURE: 86 MMHG | HEART RATE: 81 BPM | OXYGEN SATURATION: 97 % | WEIGHT: 205.03 LBS

## 2024-12-26 DIAGNOSIS — Z96.651 PRESENCE OF RIGHT ARTIFICIAL KNEE JOINT: Chronic | ICD-10-CM
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
## 2024-12-26 DIAGNOSIS — M25.562 PAIN IN LEFT KNEE: ICD-10-CM

## 2024-12-26 DIAGNOSIS — M54.50 LOW BACK PAIN, UNSPECIFIED: ICD-10-CM

## 2024-12-26 DIAGNOSIS — I10 ESSENTIAL (PRIMARY) HYPERTENSION: ICD-10-CM
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
## 2024-12-26 PROCEDURE — 99284 EMERGENCY DEPT VISIT MOD MDM: CPT

## 2024-12-26 PROCEDURE — 72131 CT LUMBAR SPINE W/O DYE: CPT | Mod: 26,MC

## 2024-12-26 PROCEDURE — 73562 X-RAY EXAM OF KNEE 3: CPT | Mod: 26,LT

## 2024-12-26 RX ORDER — NAPROXEN SODIUM 275 MG
1 TABLET ORAL
Qty: 20 | Refills: 0
Start: 2024-12-26 | End: 2025-01-04

## 2024-12-26 RX ORDER — LIDOCAINE 40 MG/G
1 CREAM TOPICAL
Qty: 4 | Refills: 0
Start: 2024-12-26 | End: 2025-01-04

## 2024-12-26 RX ORDER — LIDOCAINE 40 MG/G
1 CREAM TOPICAL ONCE
Refills: 0 | Status: COMPLETED | OUTPATIENT
Start: 2024-12-26 | End: 2024-12-26

## 2024-12-26 RX ORDER — METHOCARBAMOL 500 MG/1
2 TABLET, FILM COATED ORAL
Qty: 20 | Refills: 0
Start: 2024-12-26 | End: 2024-12-30

## 2024-12-26 RX ORDER — ACETAMINOPHEN 500MG 500 MG/1
975 TABLET, COATED ORAL ONCE
Refills: 0 | Status: COMPLETED | OUTPATIENT
Start: 2024-12-26 | End: 2024-12-26

## 2024-12-26 RX ORDER — METHOCARBAMOL 500 MG/1
1500 TABLET, FILM COATED ORAL ONCE
Refills: 0 | Status: COMPLETED | OUTPATIENT
Start: 2024-12-26 | End: 2024-12-26

## 2024-12-26 RX ORDER — KETOROLAC TROMETHAMINE 30 MG/ML
15 INJECTION INTRAMUSCULAR; INTRAVENOUS ONCE
Refills: 0 | Status: DISCONTINUED | OUTPATIENT
Start: 2024-12-26 | End: 2024-12-26

## 2024-12-26 RX ADMIN — LIDOCAINE 1 PATCH: 40 CREAM TOPICAL at 05:52

## 2024-12-26 RX ADMIN — ACETAMINOPHEN 500MG 975 MILLIGRAM(S): 500 TABLET, COATED ORAL at 05:52

## 2024-12-26 RX ADMIN — KETOROLAC TROMETHAMINE 15 MILLIGRAM(S): 30 INJECTION INTRAMUSCULAR; INTRAVENOUS at 05:52

## 2024-12-26 NOTE — ED PROVIDER NOTE - PATIENT PORTAL LINK FT
You can access the FollowMyHealth Patient Portal offered by [facility] by registering at the following website: [email or web address]. By joining [facility] FollowMyHealth portal, you will also be able to view your health information using other applications (apps) compatible with our system.

## 2024-12-26 NOTE — ED PROVIDER NOTE - PHYSICAL EXAMINATION
General: No acute distress, mentation at baseline,  well nourished, well developed  HEENT: NCAT, Neck supple without meningismus, PERRL, no conjunctival injection  Lungs: CTAB, No wheeze or crackles, No retractions, No increased work of breathing  Heart: S1S2 RRR, No M/R/G, Pules equal Bilaterally in upper and lower extremities distally  Abd: soft, NT/ND, No guarding, No rebound.  No hernias, no palpable masses.  Extrem: FROM in all joints, no gross deformities appreciated, no significant edema noted, No ulcers. Cap refil < 2sec.  Skin: No rash noted, warm dry.  Neuro:  Grossly normal.  No difficulty ambulating. No focal deficits.  Psychiatric: Appropriate mood and affect.   Back: midline ttp in lumbar region, step offs, deformities, no cvat   Patella tender in L knee, Medial and Lateral Joint lines nontender, Posterior drawer and Lachman’s exam without significant laxity, Varus and Valgus Stress without significant laxity, Full Range of Motion with full strength, Neurovascular exam distally intact, Compartments surrounding are soft

## 2024-12-26 NOTE — ED PROVIDER NOTE - NSICDXPASTMEDICALHX_GEN_ALL_CORE_FT
Let patient's parents know that I filled out the form for his college application on the immunization record.   We can give that to them and he may be due for a another meningitis vaccine before starting college next year which we can give at a future appointment if needed PAST MEDICAL HISTORY:  No pertinent past medical history

## 2024-12-26 NOTE — ED ADULT NURSE REASSESSMENT NOTE - NS ED NURSE REASSESS COMMENT FT1
Report received from RN at this time. Assessment available on [facility]. Pt endorses improved pain, discharge instructions provided and medication teaching preformed.  pt verbalized understanding of teaching.  pt verbalized understanding of need for follow up appointments and need to call to make said appointments.

## 2024-12-26 NOTE — ED ADULT TRIAGE NOTE - CHIEF COMPLAINT QUOTE
Patient reports "slipped on ice and fell landing on concrete. My whole left side hit the concrete." Denies Head Strike, Denies LOC. Patient reports pain: Left knee, and lower back. Rate pain 8/10.  PMH: DM, Right knee replacement

## 2025-03-02 ENCOUNTER — EMERGENCY (EMERGENCY)
Facility: HOSPITAL | Age: 55
LOS: 0 days | Discharge: HOME | End: 2025-03-02
Attending: STUDENT IN AN ORGANIZED HEALTH CARE EDUCATION/TRAINING PROGRAM
Payer: COMMERCIAL

## 2025-03-02 VITALS
OXYGEN SATURATION: 100 % | TEMPERATURE: 98 F | HEIGHT: 64 IN | HEART RATE: 73 BPM | RESPIRATION RATE: 18 BRPM | WEIGHT: 195.11 LBS | DIASTOLIC BLOOD PRESSURE: 79 MMHG | SYSTOLIC BLOOD PRESSURE: 125 MMHG

## 2025-03-02 VITALS
OXYGEN SATURATION: 100 % | HEART RATE: 66 BPM | RESPIRATION RATE: 18 BRPM | DIASTOLIC BLOOD PRESSURE: 76 MMHG | TEMPERATURE: 98 F | SYSTOLIC BLOOD PRESSURE: 116 MMHG

## 2025-03-02 DIAGNOSIS — E11.9 TYPE 2 DIABETES MELLITUS WITHOUT COMPLICATIONS: ICD-10-CM

## 2025-03-02 DIAGNOSIS — R50.9 FEVER, UNSPECIFIED: ICD-10-CM

## 2025-03-02 DIAGNOSIS — R05.1 ACUTE COUGH: ICD-10-CM

## 2025-03-02 DIAGNOSIS — R51.9 HEADACHE, UNSPECIFIED: ICD-10-CM

## 2025-03-02 DIAGNOSIS — R42 DIZZINESS AND GIDDINESS: ICD-10-CM

## 2025-03-02 DIAGNOSIS — R11.0 NAUSEA: ICD-10-CM

## 2025-03-02 DIAGNOSIS — Z96.651 PRESENCE OF RIGHT ARTIFICIAL KNEE JOINT: Chronic | ICD-10-CM

## 2025-03-02 DIAGNOSIS — R56.9 UNSPECIFIED CONVULSIONS: ICD-10-CM

## 2025-03-02 DIAGNOSIS — Z79.84 LONG TERM (CURRENT) USE OF ORAL HYPOGLYCEMIC DRUGS: ICD-10-CM

## 2025-03-02 DIAGNOSIS — R09.81 NASAL CONGESTION: ICD-10-CM

## 2025-03-02 DIAGNOSIS — R10.13 EPIGASTRIC PAIN: ICD-10-CM

## 2025-03-02 DIAGNOSIS — R07.89 OTHER CHEST PAIN: ICD-10-CM

## 2025-03-02 LAB
ALBUMIN SERPL ELPH-MCNC: 3.7 G/DL — SIGNIFICANT CHANGE UP (ref 3.3–5)
ALP SERPL-CCNC: 113 U/L — SIGNIFICANT CHANGE UP (ref 40–120)
ALT FLD-CCNC: 18 U/L — SIGNIFICANT CHANGE UP (ref 12–78)
ANION GAP SERPL CALC-SCNC: 8 MMOL/L — SIGNIFICANT CHANGE UP (ref 5–17)
AST SERPL-CCNC: 15 U/L — SIGNIFICANT CHANGE UP (ref 15–37)
BASOPHILS # BLD AUTO: 0.01 K/UL — SIGNIFICANT CHANGE UP (ref 0–0.2)
BASOPHILS NFR BLD AUTO: 0.2 % — SIGNIFICANT CHANGE UP (ref 0–2)
BILIRUB SERPL-MCNC: 0.5 MG/DL — SIGNIFICANT CHANGE UP (ref 0.2–1.2)
BUN SERPL-MCNC: 12 MG/DL — SIGNIFICANT CHANGE UP (ref 7–23)
CALCIUM SERPL-MCNC: 9.6 MG/DL — SIGNIFICANT CHANGE UP (ref 8.5–10.1)
CHLORIDE SERPL-SCNC: 101 MMOL/L — SIGNIFICANT CHANGE UP (ref 96–108)
CO2 SERPL-SCNC: 26 MMOL/L — SIGNIFICANT CHANGE UP (ref 22–31)
CREAT SERPL-MCNC: 0.92 MG/DL — SIGNIFICANT CHANGE UP (ref 0.5–1.3)
EGFR: 73 ML/MIN/1.73M2 — SIGNIFICANT CHANGE UP
EOSINOPHIL # BLD AUTO: 0.02 K/UL — SIGNIFICANT CHANGE UP (ref 0–0.5)
EOSINOPHIL NFR BLD AUTO: 0.3 % — SIGNIFICANT CHANGE UP (ref 0–6)
FLUAV AG NPH QL: SIGNIFICANT CHANGE UP
FLUBV AG NPH QL: SIGNIFICANT CHANGE UP
GLUCOSE SERPL-MCNC: 358 MG/DL — HIGH (ref 70–99)
HCT VFR BLD CALC: 36.3 % — SIGNIFICANT CHANGE UP (ref 34.5–45)
HGB BLD-MCNC: 12.5 G/DL — SIGNIFICANT CHANGE UP (ref 11.5–15.5)
IMM GRANULOCYTES NFR BLD AUTO: 0.3 % — SIGNIFICANT CHANGE UP (ref 0–0.9)
LIDOCAIN IGE QN: 193 U/L — HIGH (ref 13–75)
LYMPHOCYTES # BLD AUTO: 1.67 K/UL — SIGNIFICANT CHANGE UP (ref 1–3.3)
LYMPHOCYTES # BLD AUTO: 28.1 % — SIGNIFICANT CHANGE UP (ref 13–44)
MCHC RBC-ENTMCNC: 32.3 PG — SIGNIFICANT CHANGE UP (ref 27–34)
MCHC RBC-ENTMCNC: 34.4 G/DL — SIGNIFICANT CHANGE UP (ref 32–36)
MCV RBC AUTO: 93.8 FL — SIGNIFICANT CHANGE UP (ref 80–100)
MONOCYTES # BLD AUTO: 0.27 K/UL — SIGNIFICANT CHANGE UP (ref 0–0.9)
MONOCYTES NFR BLD AUTO: 4.5 % — SIGNIFICANT CHANGE UP (ref 2–14)
NEUTROPHILS # BLD AUTO: 3.96 K/UL — SIGNIFICANT CHANGE UP (ref 1.8–7.4)
NEUTROPHILS NFR BLD AUTO: 66.6 % — SIGNIFICANT CHANGE UP (ref 43–77)
NRBC BLD AUTO-RTO: 0 /100 WBCS — SIGNIFICANT CHANGE UP (ref 0–0)
PLATELET # BLD AUTO: 227 K/UL — SIGNIFICANT CHANGE UP (ref 150–400)
POTASSIUM SERPL-MCNC: 4.1 MMOL/L — SIGNIFICANT CHANGE UP (ref 3.5–5.3)
POTASSIUM SERPL-SCNC: 4.1 MMOL/L — SIGNIFICANT CHANGE UP (ref 3.5–5.3)
PROT SERPL-MCNC: 8 GM/DL — SIGNIFICANT CHANGE UP (ref 6–8.3)
RBC # BLD: 3.87 M/UL — SIGNIFICANT CHANGE UP (ref 3.8–5.2)
RBC # FLD: 11.9 % — SIGNIFICANT CHANGE UP (ref 10.3–14.5)
RSV RNA NPH QL NAA+NON-PROBE: SIGNIFICANT CHANGE UP
SARS-COV-2 RNA SPEC QL NAA+PROBE: SIGNIFICANT CHANGE UP
SODIUM SERPL-SCNC: 135 MMOL/L — SIGNIFICANT CHANGE UP (ref 135–145)
TROPONIN I, HIGH SENSITIVITY RESULT: 6.7 NG/L — SIGNIFICANT CHANGE UP
WBC # BLD: 5.95 K/UL — SIGNIFICANT CHANGE UP (ref 3.8–10.5)
WBC # FLD AUTO: 5.95 K/UL — SIGNIFICANT CHANGE UP (ref 3.8–10.5)

## 2025-03-02 PROCEDURE — 99285 EMERGENCY DEPT VISIT HI MDM: CPT

## 2025-03-02 PROCEDURE — 71045 X-RAY EXAM CHEST 1 VIEW: CPT | Mod: 26

## 2025-03-02 RX ORDER — METOCLOPRAMIDE HCL 10 MG
10 TABLET ORAL ONCE
Refills: 0 | Status: COMPLETED | OUTPATIENT
Start: 2025-03-02 | End: 2025-03-02

## 2025-03-02 RX ORDER — ONDANSETRON HCL/PF 4 MG/2 ML
1 VIAL (ML) INJECTION
Qty: 1 | Refills: 0
Start: 2025-03-02

## 2025-03-02 RX ORDER — KETOROLAC TROMETHAMINE 30 MG/ML
15 INJECTION, SOLUTION INTRAMUSCULAR; INTRAVENOUS ONCE
Refills: 0 | Status: DISCONTINUED | OUTPATIENT
Start: 2025-03-02 | End: 2025-03-02

## 2025-03-02 RX ORDER — MINERAL OIL
133 OIL (ML) MISCELLANEOUS ONCE
Refills: 0 | Status: DISCONTINUED | OUTPATIENT
Start: 2025-03-02 | End: 2025-03-02

## 2025-03-02 RX ADMIN — Medication 1000 MILLILITER(S): at 17:13

## 2025-03-02 RX ADMIN — Medication 20 MILLIGRAM(S): at 17:13

## 2025-03-02 RX ADMIN — Medication 10 MILLIGRAM(S): at 17:13

## 2025-03-02 RX ADMIN — KETOROLAC TROMETHAMINE 15 MILLIGRAM(S): 30 INJECTION, SOLUTION INTRAMUSCULAR; INTRAVENOUS at 17:12

## 2025-03-02 NOTE — ED ADULT NURSE NOTE - OBJECTIVE STATEMENT
Pt BIB EMS from home c/o dizziness, nonproductive cough and generalized weakness that began today. Pt reports experiencing headache and congestion x 3 days ago. Pt denies any known sick contacts. Pt speaking in clear complete sentences. Pt denies fever, chills, urinary symptoms or photophobia.

## 2025-03-02 NOTE — ED PROVIDER NOTE - CLINICAL SUMMARY MEDICAL DECISION MAKING FREE TEXT BOX
55yo female with pmh seizures, dm on metformin, presenting with cough, headache, congestion, abdominal/ chest discomfort, lightheadedness.  Has been having symptoms since thursday but today while in shower felt very lightheaded and weak.  Did not fall or lose consciousness.  No difficulty breathing, back pain, numbness, weakness, visual changes.  Epigastrium tender.  + Nausea, no vomiting.  CTA b/l.  Neuro intact.  Seems c/w viral syndrome/ hyperglycemia.  Glucose possibly causing worsening dehydration.  Doubt acs.  Lower concern acute intraabdominal pathology.  ECG nsr, nonischemic.  Will get labs eval phil, dehydration, lytes.  CXR r/o pna.  Abd pain likely 2/2 poor po intake, gastritis.  Will medicate symptomatically and reassess.

## 2025-03-02 NOTE — ED ADULT NURSE NOTE - CHPI ED NUR SYMPTOMS NEG
no body aches/no chills/no edema/no fever/no headache/no hemoptysis/no shortness of breath/no wheezing

## 2025-03-02 NOTE — ED PROVIDER NOTE - NSFOLLOWUPINSTRUCTIONS_ED_ALL_ED_FT
Rest, drink plenty of fluids  Advance activity as tolerated  Continue all previously prescribed medications as directed  Follow up with your PMD - bring copies of your results  Return to the ER for chest pain, difficulty breathing, worsening symptoms, or other new or concerning symptoms   For pain, you may take famotidine 20mg every 12 hours as needed  For nausea you may take ondansetron every 8 hours as needed   For pain, you may take Tylenol 975mg every six hours as needed

## 2025-03-02 NOTE — ED PROVIDER NOTE - NSICDXPASTSURGICALHX_GEN_ALL_CORE_FT
PAST SURGICAL HISTORY:  No significant past surgical history     S/P total knee replacement, right

## 2025-03-02 NOTE — ED PROVIDER NOTE - PATIENT PORTAL LINK FT
You can access the FollowMyHealth Patient Portal offered by [facility] by registering at the following website: [email or web address]. By joining [facility]’s FollowMyHealth portal, you will also be able to view your health information using other applications (apps) compatible with our system.

## 2025-03-02 NOTE — ED PROVIDER NOTE - PHYSICAL EXAMINATION
General appearance: Nontoxic appearing, conversant, afebrile    Eyes: anicteric sclerae, MICHELLE, EOMI   HENT: Atraumatic; oropharynx clear, MMM and no ulcerations, no pharyngeal erythema or exudate   Neck: Trachea midline; Full range of motion, supple   Pulm: CTA bl, normal respiratory effort and no intercostal retractions, normal work of breathing   CV: RRR, No murmurs, rubs, or gallops   Abdomen: Soft, epigastrium tender, non-distended; no guarding or rebound   Extremities: No peripheral edema, no gross deformities, FROM x4   Skin: Dry, normal temperature, turgor and texture; no rash   Psych: Appropriate affect, cooperative

## 2025-03-02 NOTE — ED ADULT TRIAGE NOTE - CHIEF COMPLAINT QUOTE
BIBEMS c/o generalized weakness and dizziness today. Reports has had congestion, headache, chest pressure since Thursday. Denies fever/chills. N/V/D. Denies fall or trauma. PMH DM.  EMS L wrist #20 100cc NS.

## 2025-06-04 NOTE — ED PROVIDER NOTE - IV ALTEPLASE INCLUSION HIDDEN
Refill policies:    Allow 2-3 business days for refills; controlled substances may take longer.  Contact your pharmacy at least 5 days prior to running out of medication and have them send an electronic request or submit request through the “request refill” option in your [employer] account.  Refills are not addressed on weekends; covering physicians do not authorize routine medications on weekends.  No narcotics or controlled substances are refilled after noon on Fridays or by on call physicians.  By law, narcotics must be electronically prescribed.  A 30 day supply with no refills is the maximum allowed.  If your prescription is due for a refill, you may be due for a follow up appointment.  To best provide you care, patients receiving routine medications need to be seen at least once a year.  Patients receiving narcotic/controlled substance medications need to be seen at least once every 3 months.  In the event that your preferred pharmacy does not have the requested medication in stock (e.g. Backordered), it is your responsibility to find another pharmacy that has the requested medication available.  We will gladly send a new prescription to that pharmacy at your request.    Scheduling Tests:    If your physician has ordered radiology tests such as MRI or CT scans, please contact Central Scheduling at [phone] right away to schedule the test.  Once scheduled, the [facility] Centralized Referral Team will work with your insurance carrier to obtain pre-certification or prior authorization.  Depending on your insurance carrier, approval may take 3-10 days.  It is highly recommended patients assure they have received an authorization before having a test performed.  If test is done without insurance authorization, patient may be responsible for the entire amount billed.      Precertification and Prior Authorizations:  If your physician has recommended that you have a procedure or additional testing performed the [facility]  Centralized Referral Team will contact your insurance carrier to obtain pre-certification or prior authorization.    You are strongly encouraged to contact your insurance carrier to verify that your procedure/test has been approved and is a COVERED benefit.  Although the [facility] Centralized Referral Team does its due diligence, the insurance carrier gives the disclaimer that \"Although the procedure is authorized, this does not guarantee payment.\"    Ultimately the patient is responsible for payment.   Thank you for your understanding in this matter.  Paperwork Completion:  If you require FMLA or disability paperwork for your recovery, please make sure to either drop it off or have it faxed to our office at [phone]. Be sure the form has your name and date of birth on it.  The form will be faxed to our Forms Department and they will complete it for you.  There is a 25$ fee for all forms that need to be filled out.  Please be aware there is a 10-14 day turnaround time.  You will need to sign a release of information (NAMITA) form if your paperwork does not come with one.  You may call the Forms Department with any questions at [phone].  Their fax number is [phone].      show